# Patient Record
Sex: FEMALE | Race: WHITE | NOT HISPANIC OR LATINO | Employment: FULL TIME | URBAN - METROPOLITAN AREA
[De-identification: names, ages, dates, MRNs, and addresses within clinical notes are randomized per-mention and may not be internally consistent; named-entity substitution may affect disease eponyms.]

---

## 2017-03-30 ENCOUNTER — LAB VISIT (OUTPATIENT)
Dept: LAB | Facility: HOSPITAL | Age: 57
End: 2017-03-30
Attending: INTERNAL MEDICINE

## 2017-03-30 DIAGNOSIS — E05.90 HYPERTHYROIDISM: ICD-10-CM

## 2017-03-30 DIAGNOSIS — E04.9 GOITER, UNSPECIFIED: Primary | ICD-10-CM

## 2017-03-30 LAB
T4 SERPL-MCNC: 7.1 UG/DL
TSH SERPL DL<=0.005 MIU/L-ACNC: 1.34 UIU/ML

## 2017-03-30 PROCEDURE — 36415 COLL VENOUS BLD VENIPUNCTURE: CPT

## 2017-03-30 PROCEDURE — 84436 ASSAY OF TOTAL THYROXINE: CPT

## 2017-03-30 PROCEDURE — 84443 ASSAY THYROID STIM HORMONE: CPT

## 2017-10-16 ENCOUNTER — LAB VISIT (OUTPATIENT)
Dept: LAB | Facility: HOSPITAL | Age: 57
End: 2017-10-16
Attending: INTERNAL MEDICINE

## 2017-10-16 DIAGNOSIS — E03.9 HYPOTHYROIDISM: Primary | ICD-10-CM

## 2017-10-16 LAB — TSH SERPL DL<=0.005 MIU/L-ACNC: 1.92 UIU/ML

## 2017-10-16 PROCEDURE — 36415 COLL VENOUS BLD VENIPUNCTURE: CPT

## 2017-10-16 PROCEDURE — 84436 ASSAY OF TOTAL THYROXINE: CPT

## 2017-10-16 PROCEDURE — 84443 ASSAY THYROID STIM HORMONE: CPT

## 2017-10-17 LAB — T4 SERPL-MCNC: 8.8 UG/DL

## 2018-04-09 ENCOUNTER — LAB VISIT (OUTPATIENT)
Dept: LAB | Facility: HOSPITAL | Age: 58
End: 2018-04-09
Attending: INTERNAL MEDICINE

## 2018-04-09 DIAGNOSIS — E03.9 HYPOTHYROIDISM, ADULT: Primary | ICD-10-CM

## 2018-04-09 LAB
T4 SERPL-MCNC: 8.7 UG/DL
TSH SERPL DL<=0.005 MIU/L-ACNC: 1.04 UIU/ML

## 2018-04-09 PROCEDURE — 84443 ASSAY THYROID STIM HORMONE: CPT

## 2018-04-09 PROCEDURE — 36415 COLL VENOUS BLD VENIPUNCTURE: CPT

## 2018-04-09 PROCEDURE — 84436 ASSAY OF TOTAL THYROXINE: CPT

## 2018-12-10 ENCOUNTER — LAB VISIT (OUTPATIENT)
Dept: LAB | Facility: HOSPITAL | Age: 58
End: 2018-12-10
Attending: INTERNAL MEDICINE

## 2018-12-10 DIAGNOSIS — E03.9 HYPOTHYROIDISM: Primary | ICD-10-CM

## 2018-12-10 LAB
T4 SERPL-MCNC: 10.2 UG/DL
TSH SERPL DL<=0.005 MIU/L-ACNC: 0.91 UIU/ML

## 2018-12-10 PROCEDURE — 36415 COLL VENOUS BLD VENIPUNCTURE: CPT

## 2018-12-10 PROCEDURE — 84443 ASSAY THYROID STIM HORMONE: CPT

## 2018-12-10 PROCEDURE — 84436 ASSAY OF TOTAL THYROXINE: CPT

## 2019-08-09 ENCOUNTER — LAB VISIT (OUTPATIENT)
Dept: LAB | Facility: HOSPITAL | Age: 59
End: 2019-08-09
Attending: INTERNAL MEDICINE
Payer: MEDICAID

## 2019-08-09 DIAGNOSIS — E03.9 HYPOTHYROIDISM: ICD-10-CM

## 2019-08-09 DIAGNOSIS — R53.83 FATIGUE: ICD-10-CM

## 2019-08-09 DIAGNOSIS — Z78.0 POST-MENOPAUSE: Primary | ICD-10-CM

## 2019-08-09 LAB
IRON SERPL-MCNC: 49 UG/DL (ref 30–160)
T4 SERPL-MCNC: 9.3 UG/DL (ref 4.5–11.5)
TSH SERPL DL<=0.005 MIU/L-ACNC: 1.25 UIU/ML (ref 0.4–4)

## 2019-08-09 PROCEDURE — 36415 COLL VENOUS BLD VENIPUNCTURE: CPT

## 2019-08-09 PROCEDURE — 84443 ASSAY THYROID STIM HORMONE: CPT

## 2019-08-09 PROCEDURE — 84436 ASSAY OF TOTAL THYROXINE: CPT

## 2019-08-09 PROCEDURE — 82306 VITAMIN D 25 HYDROXY: CPT

## 2019-08-09 PROCEDURE — 83540 ASSAY OF IRON: CPT

## 2019-08-09 PROCEDURE — 82607 VITAMIN B-12: CPT

## 2019-08-10 LAB
25(OH)D3+25(OH)D2 SERPL-MCNC: 25 NG/ML (ref 30–96)
VIT B12 SERPL-MCNC: 243 PG/ML (ref 210–950)

## 2020-01-10 ENCOUNTER — HOSPITAL ENCOUNTER (EMERGENCY)
Facility: HOSPITAL | Age: 60
Discharge: HOME | End: 2020-01-11
Attending: SURGERY
Payer: MEDICAID

## 2020-01-10 DIAGNOSIS — J40 BRONCHITIS: Primary | ICD-10-CM

## 2020-01-10 DIAGNOSIS — R06.02 SOB (SHORTNESS OF BREATH): ICD-10-CM

## 2020-01-10 LAB
BASOPHILS # BLD AUTO: 0.04 K/UL (ref 0–0.2)
BASOPHILS NFR BLD: 0.7 % (ref 0–1.9)
DIFFERENTIAL METHOD: ABNORMAL
EOSINOPHIL # BLD AUTO: 0.1 K/UL (ref 0–0.5)
EOSINOPHIL NFR BLD: 2 % (ref 0–8)
ERYTHROCYTE [DISTWIDTH] IN BLOOD BY AUTOMATED COUNT: 12.4 % (ref 11.5–14.5)
HCT VFR BLD AUTO: 41.3 % (ref 37–48.5)
HGB BLD-MCNC: 14.2 G/DL (ref 12–16)
IMM GRANULOCYTES # BLD AUTO: 0 K/UL (ref 0–0.04)
IMM GRANULOCYTES NFR BLD AUTO: 0 % (ref 0–0.5)
LYMPHOCYTES # BLD AUTO: 2.2 K/UL (ref 1–4.8)
LYMPHOCYTES NFR BLD: 38.6 % (ref 18–48)
MCH RBC QN AUTO: 28.3 PG (ref 27–31)
MCHC RBC AUTO-ENTMCNC: 34.4 G/DL (ref 32–36)
MCV RBC AUTO: 82 FL (ref 82–98)
MONOCYTES # BLD AUTO: 0.5 K/UL (ref 0.3–1)
MONOCYTES NFR BLD: 8.1 % (ref 4–15)
NEUTROPHILS # BLD AUTO: 2.8 K/UL (ref 1.8–7.7)
NEUTROPHILS NFR BLD: 50.6 % (ref 38–73)
NRBC BLD-RTO: 0 /100 WBC
PLATELET # BLD AUTO: 184 K/UL (ref 150–350)
PMV BLD AUTO: 9.1 FL (ref 9.2–12.9)
RBC # BLD AUTO: 5.01 M/UL (ref 4–5.4)
WBC # BLD AUTO: 5.59 K/UL (ref 3.9–12.7)

## 2020-01-10 PROCEDURE — 84484 ASSAY OF TROPONIN QUANT: CPT

## 2020-01-10 PROCEDURE — 83880 ASSAY OF NATRIURETIC PEPTIDE: CPT

## 2020-01-10 PROCEDURE — 80053 COMPREHEN METABOLIC PANEL: CPT

## 2020-01-10 PROCEDURE — 96375 TX/PRO/DX INJ NEW DRUG ADDON: CPT

## 2020-01-10 PROCEDURE — 85025 COMPLETE CBC W/AUTO DIFF WBC: CPT

## 2020-01-10 PROCEDURE — 93010 ELECTROCARDIOGRAM REPORT: CPT | Mod: ,,, | Performed by: INTERNAL MEDICINE

## 2020-01-10 PROCEDURE — 25000242 PHARM REV CODE 250 ALT 637 W/ HCPCS: Performed by: SURGERY

## 2020-01-10 PROCEDURE — 99285 EMERGENCY DEPT VISIT HI MDM: CPT | Mod: 25

## 2020-01-10 PROCEDURE — 83605 ASSAY OF LACTIC ACID: CPT

## 2020-01-10 PROCEDURE — 93010 EKG 12-LEAD: ICD-10-PCS | Mod: ,,, | Performed by: INTERNAL MEDICINE

## 2020-01-10 PROCEDURE — 63600175 PHARM REV CODE 636 W HCPCS: Performed by: SURGERY

## 2020-01-10 PROCEDURE — 85379 FIBRIN DEGRADATION QUANT: CPT

## 2020-01-10 PROCEDURE — 36415 COLL VENOUS BLD VENIPUNCTURE: CPT

## 2020-01-10 PROCEDURE — 85610 PROTHROMBIN TIME: CPT

## 2020-01-10 PROCEDURE — 94760 N-INVAS EAR/PLS OXIMETRY 1: CPT

## 2020-01-10 PROCEDURE — 93005 ELECTROCARDIOGRAM TRACING: CPT

## 2020-01-10 PROCEDURE — 96365 THER/PROPH/DIAG IV INF INIT: CPT

## 2020-01-10 PROCEDURE — 85730 THROMBOPLASTIN TIME PARTIAL: CPT

## 2020-01-10 PROCEDURE — 87040 BLOOD CULTURE FOR BACTERIA: CPT

## 2020-01-10 PROCEDURE — 94644 CONT INHLJ TX 1ST HOUR: CPT

## 2020-01-10 PROCEDURE — 82550 ASSAY OF CK (CPK): CPT

## 2020-01-10 PROCEDURE — 82553 CREATINE MB FRACTION: CPT

## 2020-01-10 PROCEDURE — 87502 INFLUENZA DNA AMP PROBE: CPT

## 2020-01-10 RX ORDER — LEVOFLOXACIN 5 MG/ML
750 INJECTION, SOLUTION INTRAVENOUS
Status: COMPLETED | OUTPATIENT
Start: 2020-01-10 | End: 2020-01-11

## 2020-01-10 RX ORDER — LEVOTHYROXINE SODIUM 88 UG/1
88 TABLET ORAL DAILY
COMMUNITY

## 2020-01-10 RX ORDER — PROMETHAZINE HYDROCHLORIDE AND CODEINE PHOSPHATE 6.25; 1 MG/5ML; MG/5ML
5 SOLUTION ORAL
Status: COMPLETED | OUTPATIENT
Start: 2020-01-11 | End: 2020-01-11

## 2020-01-10 RX ORDER — METHYLPREDNISOLONE SOD SUCC 125 MG
125 VIAL (EA) INJECTION
Status: COMPLETED | OUTPATIENT
Start: 2020-01-10 | End: 2020-01-10

## 2020-01-10 RX ORDER — ALBUTEROL SULFATE 2.5 MG/.5ML
10 SOLUTION RESPIRATORY (INHALATION)
Status: COMPLETED | OUTPATIENT
Start: 2020-01-10 | End: 2020-01-10

## 2020-01-10 RX ADMIN — ALBUTEROL SULFATE 10 MG: 2.5 SOLUTION RESPIRATORY (INHALATION) at 11:01

## 2020-01-10 RX ADMIN — METHYLPREDNISOLONE SODIUM SUCCINATE 125 MG: 125 INJECTION, POWDER, FOR SOLUTION INTRAMUSCULAR; INTRAVENOUS at 11:01

## 2020-01-11 VITALS
RESPIRATION RATE: 17 BRPM | SYSTOLIC BLOOD PRESSURE: 109 MMHG | OXYGEN SATURATION: 95 % | WEIGHT: 140 LBS | TEMPERATURE: 97 F | HEART RATE: 85 BPM | DIASTOLIC BLOOD PRESSURE: 56 MMHG

## 2020-01-11 LAB
ALBUMIN SERPL BCP-MCNC: 4.3 G/DL (ref 3.5–5.2)
ALP SERPL-CCNC: 104 U/L (ref 55–135)
ALT SERPL W/O P-5'-P-CCNC: 27 U/L (ref 10–44)
ANION GAP SERPL CALC-SCNC: 11 MMOL/L (ref 8–16)
APTT BLDCRRT: 28.7 SEC (ref 21–32)
AST SERPL-CCNC: 36 U/L (ref 10–40)
BILIRUB SERPL-MCNC: 0.5 MG/DL (ref 0.1–1)
BNP SERPL-MCNC: 25 PG/ML (ref 0–99)
BUN SERPL-MCNC: 14 MG/DL (ref 8–23)
CALCIUM SERPL-MCNC: 10.1 MG/DL (ref 8.7–10.5)
CHLORIDE SERPL-SCNC: 106 MMOL/L (ref 95–110)
CK MB SERPL-MCNC: 1 NG/ML (ref 0.1–6.5)
CK MB SERPL-MCNC: 1.3 NG/ML (ref 0.1–6.5)
CK MB SERPL-RTO: 1.1 % (ref 0–5)
CK MB SERPL-RTO: 1.2 % (ref 0–5)
CK SERPL-CCNC: 107 U/L (ref 20–180)
CK SERPL-CCNC: 107 U/L (ref 20–180)
CK SERPL-CCNC: 93 U/L (ref 20–180)
CK SERPL-CCNC: 93 U/L (ref 20–180)
CO2 SERPL-SCNC: 24 MMOL/L (ref 23–29)
CREAT SERPL-MCNC: 0.8 MG/DL (ref 0.5–1.4)
D DIMER PPP IA.FEU-MCNC: 0.19 MG/L FEU
EST. GFR  (AFRICAN AMERICAN): >60 ML/MIN/1.73 M^2
EST. GFR  (NON AFRICAN AMERICAN): >60 ML/MIN/1.73 M^2
GLUCOSE SERPL-MCNC: 100 MG/DL (ref 70–110)
INFLUENZA A, MOLECULAR: NEGATIVE
INFLUENZA B, MOLECULAR: NEGATIVE
INR PPP: 0.9 (ref 0.8–1.2)
LACTATE SERPL-SCNC: 1.7 MMOL/L (ref 0.5–2.2)
POTASSIUM SERPL-SCNC: 3.7 MMOL/L (ref 3.5–5.1)
PROT SERPL-MCNC: 7.8 G/DL (ref 6–8.4)
PROTHROMBIN TIME: 9.9 SEC (ref 9–12.5)
SODIUM SERPL-SCNC: 141 MMOL/L (ref 136–145)
SPECIMEN SOURCE: NORMAL
TROPONIN I SERPL DL<=0.01 NG/ML-MCNC: 0.01 NG/ML (ref 0–0.03)
TROPONIN I SERPL DL<=0.01 NG/ML-MCNC: 0.02 NG/ML (ref 0–0.03)

## 2020-01-11 PROCEDURE — 63600175 PHARM REV CODE 636 W HCPCS: Performed by: SURGERY

## 2020-01-11 PROCEDURE — 82553 CREATINE MB FRACTION: CPT

## 2020-01-11 PROCEDURE — 25000003 PHARM REV CODE 250: Performed by: SURGERY

## 2020-01-11 PROCEDURE — 36415 COLL VENOUS BLD VENIPUNCTURE: CPT

## 2020-01-11 PROCEDURE — 84484 ASSAY OF TROPONIN QUANT: CPT

## 2020-01-11 PROCEDURE — 82550 ASSAY OF CK (CPK): CPT

## 2020-01-11 RX ORDER — LEVOFLOXACIN 500 MG/1
500 TABLET, FILM COATED ORAL DAILY
Qty: 7 TABLET | Refills: 0 | Status: SHIPPED | OUTPATIENT
Start: 2020-01-11 | End: 2020-01-18

## 2020-01-11 RX ORDER — METHYLPREDNISOLONE 4 MG/1
TABLET ORAL
Qty: 1 PACKAGE | Refills: 0 | Status: SHIPPED | OUTPATIENT
Start: 2020-01-11 | End: 2020-08-12

## 2020-01-11 RX ORDER — METHYLPREDNISOLONE 4 MG/1
TABLET ORAL
Qty: 1 PACKAGE | Refills: 0 | Status: SHIPPED | OUTPATIENT
Start: 2020-01-11 | End: 2020-01-11 | Stop reason: SDUPTHER

## 2020-01-11 RX ORDER — PROMETHAZINE HYDROCHLORIDE AND DEXTROMETHORPHAN HYDROBROMIDE 6.25; 15 MG/5ML; MG/5ML
5 SYRUP ORAL EVERY 6 HOURS PRN
Qty: 118 ML | Refills: 0 | Status: SHIPPED | OUTPATIENT
Start: 2020-01-11 | End: 2020-01-11 | Stop reason: SDUPTHER

## 2020-01-11 RX ORDER — PROMETHAZINE HYDROCHLORIDE AND DEXTROMETHORPHAN HYDROBROMIDE 6.25; 15 MG/5ML; MG/5ML
5 SYRUP ORAL EVERY 6 HOURS PRN
Qty: 118 ML | Refills: 0 | Status: SHIPPED | OUTPATIENT
Start: 2020-01-11 | End: 2020-01-21

## 2020-01-11 RX ORDER — LEVOFLOXACIN 500 MG/1
500 TABLET, FILM COATED ORAL DAILY
Qty: 7 TABLET | Refills: 0 | Status: SHIPPED | OUTPATIENT
Start: 2020-01-11 | End: 2020-01-11 | Stop reason: SDUPTHER

## 2020-01-11 RX ADMIN — LEVOFLOXACIN 750 MG: 750 INJECTION, SOLUTION INTRAVENOUS at 01:01

## 2020-01-11 RX ADMIN — PROMETHAZINE HYDROCHLORIDE AND CODEINE PHOSPHATE 5 ML: 10; 6.25 SOLUTION ORAL at 12:01

## 2020-01-11 NOTE — ED PROVIDER NOTES
[facility]                                                 Chief Complaint  59 y.o. 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 No pertinent surgical history.   No Known Allergies     I have reviewed all of this patient's past medical, surgical, family, and social   histories as well as active allergies and medications documented in the  electronic medical record    Review of Systems and Physical Exam      Review of Systems  -- Constitution - no fever, denies fatigue, no weakness, no chills  -- Eyes - no tearing or redness, no visual disturbance  -- Ear, Nose - no tinnitus or earache, no nasal congestion or discharge  -- Mouth,Throat - no sore throat, no toothache, normal voice, normal swallowing  -- Respiratory - cough and congestion, shortness of breath, sputum production  -- Cardiovascular - denies chest pain, no palpitations, denies claudication  -- Gastrointestinal - denies abdominal pain, nausea, vomiting, or diarrhea  -- Genitourinary - no dysuria, denies flank pain, no hematuria, no STD risk  -- Musculoskeletal - denies back pain, negative for trauma or injury  -- Neurological - no headache, denies weakness or seizure; no LOC  -- Skin - denies pallor, rash, or changes in skin. no hives or welts noted  -- Psychiatric - Denies SI or HI, no psychosis or fractured thought noted     Vital Signs  Her tympanic temperature is 97.2 °F (36.2  °C).   Her blood pressure is 109/56 and her pulse is 85.   Her respiration is 17 and oxygen saturation is 95%.     Physical Exam  -- Nursing note and vitals reviewed  -- Constitutional: Appears well-developed and well-nourished  -- Head: Atraumatic. Normocephalic. No obvious abnormality  -- Eyes: Pupils are equal and reactive to light. Normal conjunctiva and lids  -- Nose: Nose normal in appearance, nares grossly normal. No discharge  -- Throat: Mucous membranes moist, pharynx normal, normal tonsils. No lesions   -- Ears: External ears and TM normal bilaterally. Normal hearing and no drainage  -- Neck: Normal range of motion. Neck supple. No masses, trachea midline  -- Cardiac: Normal rate, regular rhythm and normal heart sounds  -- Pulmonary: faint rhonchi at the bilateral bases with no active wheezing   -- Abdominal: Soft, no tenderness. Normal bowel sounds. Normal liver edge  -- Musculoskeletal: Normal range of motion, no effusions. Joints stable   -- Neurological: No focal deficits. Showed good interaction with staff  -- Vascular: Posterior tibial, dorsalis pedis and radial pulses 2+ bilaterally    -- Lymphatics: No cervical or peripheral lymphadenopathy. No edema noted  -- Skin: Warm and dry. No evidence of rash or cellulitis  -- Psychiatric: Normal mood and affect. Bedside behavior is appropriate    Emergency Room Course      Lab Results     K 3.7      CO2 24   BUN 14   CREATININE 0.8      ALKPHOS 104   AST 36   ALT 27   BILITOT 0.5   ALBUMIN 4.3   PROT 7.8   WBC 5.59   HGB 14.2   HCT 41.3      CPK 93   CPK 93   CPKMB 1.0   TROPONINI 0.006   INR 0.9   BNP 25   DDIMER 0.19   LACTATE 1.7     EKG   -- The EKG findings today were without concerning findings from baseline  -- The troponin drawn in the ER today was within normal limits  -- The 2nd troponin drawn in the ER today was within normal limits      Radiology  -- Chest x-ray showed no infiltrate and showed no acute pathology      Medications Given  albuterol sulfate nebulizer solution 10 mg (10 mg Nebulization Given 1/10/20 3252)   methylPREDNISolone sodium succinate injection 125 mg (125 mg Intravenous Given 1/10/20 6285)   levoFLOXacin 750 mg/150 mL IVPB 750 mg (0 mg Intravenous Stopped 1/11/20 0303)   promethazine-codeine 6.25-10 mg/5 ml syrup 5 mL (5 mLs Oral Given 1/11/20 0004)     ED Physician Management  -- Diagnosis management comments: 59 y.o. female with cough and congestion  -- patient with cough and congestion with sputum production reported on ROS  -- patient had a negative x-ray negative metabolic workup today in the ER also  -- patient given IV steroids and IV Levaquin today in the ER, breathing treatment   -- as a precaution 2 sets of negative troponins were performed, normal EKG  -- patient feels much better will go home on antibiotics and cough syrup/steroids  -- return to the ER with any concerning signs or symptoms after discharge today    Diagnosis  -- The primary encounter diagnosis was Bronchitis.   -- A diagnosis of SOB (shortness of breath) was also pertinent to this visit.    Disposition and Plan  -- Disposition: home  -- Condition: stable  -- Follow-up: Patient to follow up with Sunny Sharma MD in 1-2 days.  -- I advised the patient that we have found no life threatening condition today  -- At this time, I believe the patient is clinically stable for discharge.   -- The patient acknowledges that close follow up with a MD is required   -- Patient agrees to comply with all instruction and direction given in the ER    This note is dictated on M*Modal word recognition program.  There are word recognition mistakes that are occasionally missed on review.         Shukri Connell MD  01/11/20 0827

## 2020-01-11 NOTE — ED TRIAGE NOTES
Patient reports coughing up green sputum the past few days. She reports tonight she became short of breath with pleurisy.

## 2020-01-16 LAB
BACTERIA BLD CULT: NORMAL
BACTERIA BLD CULT: NORMAL

## 2020-05-07 ENCOUNTER — LAB VISIT (OUTPATIENT)
Dept: LAB | Facility: HOSPITAL | Age: 60
End: 2020-05-07
Attending: INTERNAL MEDICINE
Payer: MEDICAID

## 2020-05-07 DIAGNOSIS — E03.9 MYXEDEMA HEART DISEASE: Primary | ICD-10-CM

## 2020-05-07 DIAGNOSIS — E55.9 VITAMIN D DEFICIENCY: ICD-10-CM

## 2020-05-07 DIAGNOSIS — I51.9 MYXEDEMA HEART DISEASE: Primary | ICD-10-CM

## 2020-05-07 LAB
T4 SERPL-MCNC: 9.2 UG/DL (ref 4.5–11.5)
TSH SERPL DL<=0.005 MIU/L-ACNC: 1.12 UIU/ML (ref 0.4–4)

## 2020-05-07 PROCEDURE — 84443 ASSAY THYROID STIM HORMONE: CPT

## 2020-05-07 PROCEDURE — 84436 ASSAY OF TOTAL THYROXINE: CPT

## 2020-05-07 PROCEDURE — 36415 COLL VENOUS BLD VENIPUNCTURE: CPT

## 2020-05-07 PROCEDURE — 82306 VITAMIN D 25 HYDROXY: CPT

## 2020-05-08 LAB — 25(OH)D3+25(OH)D2 SERPL-MCNC: 33 NG/ML (ref 30–96)

## 2020-06-25 ENCOUNTER — HOSPITAL ENCOUNTER (EMERGENCY)
Facility: HOSPITAL | Age: 60
Discharge: HOME | End: 2020-06-25
Attending: SURGERY
Payer: MEDICAID

## 2020-06-25 VITALS
RESPIRATION RATE: 16 BRPM | OXYGEN SATURATION: 97 % | TEMPERATURE: 98 F | DIASTOLIC BLOOD PRESSURE: 65 MMHG | SYSTOLIC BLOOD PRESSURE: 124 MMHG | HEART RATE: 82 BPM

## 2020-06-25 DIAGNOSIS — R52 PAIN: ICD-10-CM

## 2020-06-25 DIAGNOSIS — S62.102A CLOSED FRACTURE OF LEFT WRIST, INITIAL ENCOUNTER: Primary | ICD-10-CM

## 2020-06-25 PROCEDURE — 99284 EMERGENCY DEPT VISIT MOD MDM: CPT | Mod: 25

## 2020-06-25 PROCEDURE — 25000003 PHARM REV CODE 250: Performed by: SURGERY

## 2020-06-25 RX ORDER — IBUPROFEN 800 MG/1
800 TABLET ORAL EVERY 6 HOURS PRN
Qty: 20 TABLET | Refills: 0 | Status: SHIPPED | OUTPATIENT
Start: 2020-06-25 | End: 2020-08-12

## 2020-06-25 RX ORDER — IBUPROFEN 800 MG/1
800 TABLET ORAL
Status: COMPLETED | OUTPATIENT
Start: 2020-06-25 | End: 2020-06-25

## 2020-06-25 RX ORDER — CYCLOBENZAPRINE HCL 10 MG
10 TABLET ORAL 3 TIMES DAILY PRN
Qty: 10 TABLET | Refills: 0 | Status: SHIPPED | OUTPATIENT
Start: 2020-06-25 | End: 2020-06-30

## 2020-06-25 RX ORDER — ACETAMINOPHEN 500 MG
1000 TABLET ORAL
Status: COMPLETED | OUTPATIENT
Start: 2020-06-25 | End: 2020-06-25

## 2020-06-25 RX ADMIN — ACETAMINOPHEN 1000 MG: 500 TABLET, FILM COATED ORAL at 11:06

## 2020-06-25 RX ADMIN — IBUPROFEN 800 MG: 800 TABLET ORAL at 11:06

## 2020-06-25 NOTE — Clinical Note
Vikki Hubbard was seen and treated in our emergency department on 6/25/2020.  She may return to work after being cleared by follow-up physician 07/09/2020.       If you have any questions or concerns, please don't hesitate to call.      ROSA HINES RN

## 2020-06-25 NOTE — ED TRIAGE NOTES
STATED SHE TRIPPED, FELL BACKWARDS AND HURT LEFT WRIST.  NO DEFORMITY NOTED.  MILD EDEMA.  GOOD DISTAL SENSATION.

## 2020-06-25 NOTE — ED PROVIDER NOTES
[facility]                                                 Chief Complaint  59 y.o. female with Wrist Injury (LEFT WRIST PAIN/FALL)      History of Present Illness  Vikki Hubbard presents to the emergency room with left wrist pain today  Accidental slip and fall with immediate left wrist pain after the injury  No gross deformity on ER evaluation, no swelling noted on ER exam  X-ray shows a minimal nondisplaced radial wrist fracture today here    The history is provided by the patient   device was not used during this ER visit  Medical history: Thyroid disease  History reviewed. No pertinent surgical history.   No Known Allergies     I have reviewed all of this patient's past medical, surgical, family, and social   histories as well as active allergies and medications documented in the  electronic medical record    Review of Systems and Physical Exam      Review of Systems  -- Constitution - no fever, denies fatigue, no weakness, no chills  -- Eyes - no tearing or redness, no visual disturbance  -- Ear, Nose - no tinnitus or earache, no nasal congestion or discharge  -- Mouth,Throat - no sore throat, no toothache, normal voice, normal swallowing  -- Respiratory - denies cough and congestion, no shortness of breath, no RAY  -- Cardiovascular - denies chest pain, no palpitations, denies claudication  -- Gastrointestinal - denies abdominal pain, nausea, vomiting, or diarrhea  -- Genitourinary - no dysuria, denies flank pain, no hematuria, no STD risk  -- Musculoskeletal - left wrist pain  -- Neurological - no headache, denies weakness or seizure; no LOC  -- Skin - denies pallor, rash, or changes in skin. no hives or welts noted    Vital Signs  Her blood pressure is 124/65 and her pulse is 82.   Her respiration is 16 and oxygen saturation is 97%.     Physical Exam  -- Nursing note and vitals reviewed  -- Constitutional: Appears well-developed and well-nourished  -- Head: Atraumatic.  Normocephalic. No obvious abnormality  -- Eyes: Pupils are equal and reactive to light. Normal conjunctiva and lids  -- Cardiac: Normal rate, regular rhythm and normal heart sounds  -- Pulmonary: Normal respiratory effort, breath sounds clear to auscultation  -- Abdominal: Soft, no tenderness. Normal bowel sounds. Normal liver edge  -- Musculoskeletal: Normal range of motion, no effusions. Joints stable   -- Neurological: No focal deficits. Showed good interaction with staff  -- Vascular: Posterior tibial, dorsalis pedis and radial pulses 2+ bilaterally      Emergency Room Course      Treatment and Evaluation  -- left radial wrist fracture on ER evaluation  -- Velco wrist splint applied by the physician  -- Sling on the affected limb by the CNA for comfort and decreased pain    -- PO 1 g Tylenol given in today in the ER  --  mg Motrin given in today in the ER    Diagnosis  [S62.102A] Closed fracture of left wrist, initial encounter (Primary)    Disposition and Plan  -- Disposition: home  -- Condition: stable  -- Follow-up: Patient to follow up with MD in 1-2 days.  -- I advised the patient that we have found no life threatening condition today  -- At this time, I believe the patient is clinically stable for discharge.   -- The patient acknowledges that close follow up with a MD is required   -- Patient agrees to comply with all instruction and direction given in the ER    This note is dictated on M*Modal word recognition program.  There are word recognition mistakes that are occasionally missed on review.         Shukri Connell MD  06/25/20 2861

## 2020-06-25 NOTE — Clinical Note
Vikki Hubbard was seen and treated in our emergency department on 6/25/2020.  She may return to work on 07/03/2020.       If you have any questions or concerns, please don't hesitate to call.      ROSA HINES RN

## 2020-08-12 ENCOUNTER — OFFICE VISIT (OUTPATIENT)
Dept: OBSTETRICS AND GYNECOLOGY | Facility: CLINIC | Age: 60
End: 2020-08-12
Payer: MEDICAID

## 2020-08-12 VITALS
HEART RATE: 105 BPM | SYSTOLIC BLOOD PRESSURE: 122 MMHG | BODY MASS INDEX: 25.17 KG/M2 | HEIGHT: 66 IN | RESPIRATION RATE: 14 BRPM | DIASTOLIC BLOOD PRESSURE: 68 MMHG | WEIGHT: 156.63 LBS

## 2020-08-12 DIAGNOSIS — N95.2 POST-MENOPAUSAL ATROPHIC VAGINITIS: ICD-10-CM

## 2020-08-12 DIAGNOSIS — N94.10 DYSPAREUNIA IN FEMALE: ICD-10-CM

## 2020-08-12 DIAGNOSIS — Z12.31 SCREENING MAMMOGRAM, ENCOUNTER FOR: ICD-10-CM

## 2020-08-12 DIAGNOSIS — Z12.4 CERVICAL CANCER SCREENING: ICD-10-CM

## 2020-08-12 DIAGNOSIS — Z01.419 WELL WOMAN EXAM WITH ROUTINE GYNECOLOGICAL EXAM: Primary | ICD-10-CM

## 2020-08-12 DIAGNOSIS — Z78.0 ENCOUNTER FOR OSTEOPOROSIS SCREENING IN ASYMPTOMATIC POSTMENOPAUSAL PATIENT: ICD-10-CM

## 2020-08-12 DIAGNOSIS — Z13.820 ENCOUNTER FOR OSTEOPOROSIS SCREENING IN ASYMPTOMATIC POSTMENOPAUSAL PATIENT: ICD-10-CM

## 2020-08-12 PROCEDURE — 87624 HPV HI-RISK TYP POOLED RSLT: CPT

## 2020-08-12 PROCEDURE — 99386 PR PREVENTIVE VISIT,NEW,40-64: ICD-10-PCS | Mod: S$PBB,,, | Performed by: OBSTETRICS & GYNECOLOGY

## 2020-08-12 PROCEDURE — 99999 PR PBB SHADOW E&M-EST. PATIENT-LVL III: CPT | Mod: PBBFAC,,, | Performed by: OBSTETRICS & GYNECOLOGY

## 2020-08-12 PROCEDURE — 99213 OFFICE O/P EST LOW 20 MIN: CPT | Mod: PBBFAC,PN | Performed by: OBSTETRICS & GYNECOLOGY

## 2020-08-12 PROCEDURE — 99999 PR PBB SHADOW E&M-EST. PATIENT-LVL III: ICD-10-PCS | Mod: PBBFAC,,, | Performed by: OBSTETRICS & GYNECOLOGY

## 2020-08-12 PROCEDURE — 88175 CYTOPATH C/V AUTO FLUID REDO: CPT

## 2020-08-12 PROCEDURE — 99386 PREV VISIT NEW AGE 40-64: CPT | Mod: S$PBB,,, | Performed by: OBSTETRICS & GYNECOLOGY

## 2020-08-12 RX ORDER — ESTRADIOL 0.1 MG/G
1 CREAM VAGINAL
Qty: 42.5 G | Refills: 1 | Status: SHIPPED | OUTPATIENT
Start: 2020-08-13 | End: 2021-08-13

## 2020-08-12 NOTE — PROGRESS NOTES
Subjective:    Patient ID: Vikki Hubbard is a 59 y.o. y.o. female.     Chief Complaint: Annual Well Woman Exam     History of Present Illness:  Vikki presents today for Annual Well Woman exam. She describes her menses as menopausal since  with no PMB.She denies pelvic pain.  She denies breast tenderness, masses, nipple discharge. She denies difficulty with urination or bowel movements. She reports vaginal dryness and pain with intercourse secondary to dryness. She denies bloating, early satiety, or weight changes. She is sexually active.     Menstrual History:   No LMP recorded. Patient is postmenopausal..     OB History    : 4  Para: 4  Term: 4            The following portions of the patient's history were reviewed and updated as appropriate: allergies, current medications, past family history, past medical history, past social history, past surgical history and problem list.    ROS:   CONSTITUTIONAL: Negative for fever, chills, diaphoresis, weakness, fatigue, weight loss, weight gain  ENT: negative for sore throat, nasal congestion, nasal discharge, epistaxis, tinnitus, hearing loss  EYES: negative for blurry vision, decreased vision, loss of vision, eye pain, diplopia, photophobia, discharge  SKIN: Negative for rash, itching, hives  RESPIRATORY: negative for cough, hemoptysis, shortness of breath, pleuritic chest pain, wheezing  CARDIOVASCULAR: negative for chest pain, dyspnea on exertion, orthopnea, paroxysmal nocturnal dyspnea, edema, palpitations  BREAST: negative for breast  tenderness, breast mass, nipple discharge, or skin changes  GASTROINTESTINAL: negative for abdominal pain, flank pain, nausea, vomiting, diarrhea, constipation, black stool, blood in stool  GENITOURINARY: negative for abnormal vaginal bleeding, amenorrhea, decreased libido, dysuria, genital sores, hematuria, incontinence, menorrhagia, pelvic pain, urinary frequency, vaginal discharge  HEMATOLOGIC/LYMPHATIC: negative for  swollen lymph nodes, bleeding, bruising  MUSCULOSKELETAL: negative for back pain, joint pain, joint stiffness, joint swelling, muscle pain, muscle weakness  NEUROLOGICAL: negative for dizzy/vertigo, headache, focal weakness, numbness/tingling, speech problems, loss of consciousness, confusion, memory loss  BEHAVORIAL/PSYCH: negative for anxiety, depression, psychosis  ENDOCRINE: thyroid d/o, negative for polydipsia/polyuria, palpitations, skin changes, temperature intolerance, unexpected weight changes  ALLERGIC/IMMUNOLOGIC: negative for urticaria, hay fever, angioedema      Objective:    Vital Signs:  Vitals:    08/12/20 1138   BP: 122/68   Pulse: 105   Resp: 14       Physical Exam:  General:  alert, cooperative, no distress   Skin:  Skin color, texture, turgor normal. No rashes or lesions   HEENT:  extra ocular movement intact, sclera clear, anicteric   Neck: supple, trachea midline, no adenopathy or thyromegally   Respiratory:  Normal effort   Breasts:  no discharge, erythema, tenderness, or palpable masses; no axillary lymphadenopathy   Abdomen:  soft, nontender, no palpable masses   Pelvis: External genitalia: normal general appearance  Urinary system: urethral meatus normal, bladder nontender  Vaginal: normal without tenderness, induration or masses, atrophic mucosa, rectocele present, grade 1  Cervix: normal appearance  Uterus: normal size, shape, position  Adnexa: normal size, nontender bilaterally   Extremities: Normal ROM; no edema, no cyanosis   Neurologial: Normal strength and tone. No focal numbness or weakness.   Psychiatric: normal mood, speech, dress, and thought processes         Assessment:       Healthy female exam.     1. Well woman exam with routine gynecological exam    2. Cervical cancer screening    3. Post-menopausal atrophic vaginitis    4. Dyspareunia in female    5. Screening mammogram, encounter for    6. Encounter for osteoporosis screening in asymptomatic postmenopausal patient           Plan:      Well woman exam with routine gynecological exam    Cervical cancer screening  -     Liquid-Based Pap Smear, Screening  -     HPV High Risk Genotypes, PCR    Post-menopausal atrophic vaginitis  -     estradioL (ESTRACE) 0.01 % (0.1 mg/gram) vaginal cream; Place 1 g vaginally twice a week.  Dispense: 42.5 g; Refill: 1    Dyspareunia in female  -     estradioL (ESTRACE) 0.01 % (0.1 mg/gram) vaginal cream; Place 1 g vaginally twice a week.  Dispense: 42.5 g; Refill: 1    Screening mammogram, encounter for  -     Mammo Digital Screening Bilat w/ [place]; Future; Expected date: 08/12/2020    Encounter for osteoporosis screening in asymptomatic postmenopausal patient  -     DXA Bone Density Spine And Hip; Future; Expected date: 08/12/2020            COUNSELING:  Vikki was counseled on A.C.O.G. Pap guidelines and recommendations for yearly pelvic exams in addition to recommendations for monthly self breast exams; to see her PCP for other health maintenance.

## 2020-08-24 LAB
HPV HR 12 DNA SPEC QL NAA+PROBE: NEGATIVE
HPV16 AG SPEC QL: NEGATIVE
HPV18 DNA SPEC QL NAA+PROBE: NEGATIVE

## 2020-08-27 LAB
FINAL PATHOLOGIC DIAGNOSIS: NORMAL
Lab: NORMAL

## 2020-09-09 ENCOUNTER — HOSPITAL ENCOUNTER (OUTPATIENT)
Dept: RADIOLOGY | Facility: HOSPITAL | Age: 60
Discharge: HOME | End: 2020-09-09
Attending: OBSTETRICS & GYNECOLOGY
Payer: MEDICAID

## 2020-09-09 VITALS — WEIGHT: 156 LBS | BODY MASS INDEX: 25.07 KG/M2 | HEIGHT: 66 IN

## 2020-09-09 DIAGNOSIS — Z13.820 ENCOUNTER FOR OSTEOPOROSIS SCREENING IN ASYMPTOMATIC POSTMENOPAUSAL PATIENT: ICD-10-CM

## 2020-09-09 DIAGNOSIS — Z78.0 ENCOUNTER FOR OSTEOPOROSIS SCREENING IN ASYMPTOMATIC POSTMENOPAUSAL PATIENT: ICD-10-CM

## 2020-09-09 DIAGNOSIS — Z12.31 SCREENING MAMMOGRAM, ENCOUNTER FOR: ICD-10-CM

## 2020-09-09 PROCEDURE — 77067 SCR MAMMO BI INCL CAD: CPT | Mod: TC

## 2020-09-09 PROCEDURE — 77080 DXA BONE DENSITY AXIAL: CPT | Mod: TC

## 2020-09-09 PROCEDURE — 77063 BREAST TOMOSYNTHESIS BI: CPT | Mod: 26,,, | Performed by: RADIOLOGY

## 2020-09-09 PROCEDURE — 77080 DEXA BONE DENSITY SPINE HIP: ICD-10-PCS | Mod: 26,,, | Performed by: RADIOLOGY

## 2020-09-09 PROCEDURE — 77063 MAMMO DIGITAL SCREENING BILAT WITH TOMOSYNTHESIS_CAD: ICD-10-PCS | Mod: 26,,, | Performed by: RADIOLOGY

## 2020-09-09 PROCEDURE — 77067 SCR MAMMO BI INCL CAD: CPT | Mod: 26,,, | Performed by: RADIOLOGY

## 2020-09-09 PROCEDURE — 77080 DXA BONE DENSITY AXIAL: CPT | Mod: 26,,, | Performed by: RADIOLOGY

## 2020-09-09 PROCEDURE — 77067 MAMMO DIGITAL SCREENING BILAT WITH TOMOSYNTHESIS_CAD: ICD-10-PCS | Mod: 26,,, | Performed by: RADIOLOGY

## 2020-11-10 ENCOUNTER — LAB VISIT (OUTPATIENT)
Dept: LAB | Facility: HOSPITAL | Age: 60
End: 2020-11-10
Attending: INTERNAL MEDICINE
Payer: MEDICAID

## 2020-11-10 DIAGNOSIS — E03.9 HYPOTHYROIDISM, ADULT: Primary | ICD-10-CM

## 2020-11-10 LAB
T4 SERPL-MCNC: 8.4 UG/DL (ref 4.5–11.5)
TSH SERPL DL<=0.005 MIU/L-ACNC: 2.46 UIU/ML (ref 0.4–4)

## 2020-11-10 PROCEDURE — 84443 ASSAY THYROID STIM HORMONE: CPT

## 2020-11-10 PROCEDURE — 36415 COLL VENOUS BLD VENIPUNCTURE: CPT

## 2020-11-10 PROCEDURE — 84436 ASSAY OF TOTAL THYROXINE: CPT

## 2021-05-28 ENCOUNTER — LAB VISIT (OUTPATIENT)
Dept: LAB | Facility: HOSPITAL | Age: 61
End: 2021-05-28
Attending: INTERNAL MEDICINE
Payer: MEDICAID

## 2021-05-28 DIAGNOSIS — E03.9 HYPOTHYROIDISM: ICD-10-CM

## 2021-05-28 DIAGNOSIS — R53.83 FATIGUE: Primary | ICD-10-CM

## 2021-05-28 LAB
25(OH)D3+25(OH)D2 SERPL-MCNC: 36 NG/ML (ref 30–96)
T4 SERPL-MCNC: 9.8 UG/DL (ref 4.5–11.5)
TSH SERPL DL<=0.005 MIU/L-ACNC: 1.25 UIU/ML (ref 0.4–4)

## 2021-05-28 PROCEDURE — 36415 COLL VENOUS BLD VENIPUNCTURE: CPT | Performed by: INTERNAL MEDICINE

## 2021-05-28 PROCEDURE — 84436 ASSAY OF TOTAL THYROXINE: CPT | Performed by: INTERNAL MEDICINE

## 2021-05-28 PROCEDURE — 84443 ASSAY THYROID STIM HORMONE: CPT | Performed by: INTERNAL MEDICINE

## 2021-05-28 PROCEDURE — 82306 VITAMIN D 25 HYDROXY: CPT | Performed by: INTERNAL MEDICINE

## 2021-11-30 ENCOUNTER — LAB VISIT (OUTPATIENT)
Dept: LAB | Facility: HOSPITAL | Age: 61
End: 2021-11-30
Attending: INTERNAL MEDICINE
Payer: MEDICAID

## 2021-11-30 DIAGNOSIS — E55.9 VITAMIN D DEFICIENCY: ICD-10-CM

## 2021-11-30 DIAGNOSIS — E03.9 PRIMARY HYPOTHYROIDISM: Primary | ICD-10-CM

## 2021-11-30 LAB
25(OH)D3+25(OH)D2 SERPL-MCNC: 34 NG/ML (ref 30–96)
T4 SERPL-MCNC: 7.7 UG/DL (ref 4.5–11.5)
TSH SERPL DL<=0.005 MIU/L-ACNC: 1.94 UIU/ML (ref 0.4–4)

## 2021-11-30 PROCEDURE — 84436 ASSAY OF TOTAL THYROXINE: CPT | Performed by: INTERNAL MEDICINE

## 2021-11-30 PROCEDURE — 84443 ASSAY THYROID STIM HORMONE: CPT | Performed by: INTERNAL MEDICINE

## 2021-11-30 PROCEDURE — 36415 COLL VENOUS BLD VENIPUNCTURE: CPT | Performed by: INTERNAL MEDICINE

## 2021-11-30 PROCEDURE — 82306 VITAMIN D 25 HYDROXY: CPT | Performed by: INTERNAL MEDICINE

## 2022-06-20 ENCOUNTER — LAB VISIT (OUTPATIENT)
Dept: LAB | Facility: HOSPITAL | Age: 62
End: 2022-06-20
Attending: INTERNAL MEDICINE

## 2022-06-20 DIAGNOSIS — E55.9 VITAMIN D DEFICIENCY: ICD-10-CM

## 2022-06-20 DIAGNOSIS — E03.9 HYPOTHYROIDISM: Primary | ICD-10-CM

## 2022-06-20 LAB
T4 SERPL-MCNC: 9.8 UG/DL (ref 4.5–11.5)
TSH SERPL DL<=0.005 MIU/L-ACNC: 0.53 UIU/ML (ref 0.4–4)

## 2022-06-20 PROCEDURE — 36415 COLL VENOUS BLD VENIPUNCTURE: CPT | Performed by: INTERNAL MEDICINE

## 2022-06-20 PROCEDURE — 82306 VITAMIN D 25 HYDROXY: CPT | Performed by: INTERNAL MEDICINE

## 2022-06-20 PROCEDURE — 84436 ASSAY OF TOTAL THYROXINE: CPT | Performed by: INTERNAL MEDICINE

## 2022-06-20 PROCEDURE — 84443 ASSAY THYROID STIM HORMONE: CPT | Performed by: INTERNAL MEDICINE

## 2022-06-21 LAB — 25(OH)D3+25(OH)D2 SERPL-MCNC: 31 NG/ML (ref 30–96)

## 2022-12-06 ENCOUNTER — LAB VISIT (OUTPATIENT)
Dept: LAB | Facility: HOSPITAL | Age: 62
End: 2022-12-06
Attending: INTERNAL MEDICINE

## 2022-12-06 DIAGNOSIS — E55.9 VITAMIN D DEFICIENCY: Primary | ICD-10-CM

## 2022-12-06 DIAGNOSIS — E03.9 HYPOTHYROIDISM: ICD-10-CM

## 2022-12-06 LAB
25(OH)D3+25(OH)D2 SERPL-MCNC: 31 NG/ML (ref 30–96)
T4 SERPL-MCNC: 10.2 UG/DL (ref 4.5–11.5)
TSH SERPL DL<=0.005 MIU/L-ACNC: 1.13 UIU/ML (ref 0.4–4)

## 2022-12-06 PROCEDURE — 84436 ASSAY OF TOTAL THYROXINE: CPT | Performed by: INTERNAL MEDICINE

## 2022-12-06 PROCEDURE — 84443 ASSAY THYROID STIM HORMONE: CPT | Performed by: INTERNAL MEDICINE

## 2022-12-06 PROCEDURE — 82306 VITAMIN D 25 HYDROXY: CPT | Performed by: INTERNAL MEDICINE

## 2022-12-06 PROCEDURE — 36415 COLL VENOUS BLD VENIPUNCTURE: CPT | Performed by: INTERNAL MEDICINE

## 2023-06-13 ENCOUNTER — LAB VISIT (OUTPATIENT)
Dept: LAB | Facility: HOSPITAL | Age: 63
End: 2023-06-13
Attending: INTERNAL MEDICINE

## 2023-06-13 DIAGNOSIS — E55.9 VITAMIN D DEFICIENCY: ICD-10-CM

## 2023-06-13 DIAGNOSIS — E03.9 HYPOTHYROIDISM: Primary | ICD-10-CM

## 2023-06-13 LAB
25(OH)D3+25(OH)D2 SERPL-MCNC: 29 NG/ML (ref 30–96)
T4 SERPL-MCNC: 8.8 UG/DL (ref 4.5–11.5)
TSH SERPL DL<=0.005 MIU/L-ACNC: 0.69 UIU/ML (ref 0.4–4)

## 2023-06-13 PROCEDURE — 84443 ASSAY THYROID STIM HORMONE: CPT | Performed by: INTERNAL MEDICINE

## 2023-06-13 PROCEDURE — 82306 VITAMIN D 25 HYDROXY: CPT | Performed by: INTERNAL MEDICINE

## 2023-06-13 PROCEDURE — 84436 ASSAY OF TOTAL THYROXINE: CPT | Performed by: INTERNAL MEDICINE

## 2023-06-13 PROCEDURE — 36415 COLL VENOUS BLD VENIPUNCTURE: CPT | Performed by: INTERNAL MEDICINE

## 2023-12-04 ENCOUNTER — LAB VISIT (OUTPATIENT)
Dept: LAB | Facility: HOSPITAL | Age: 63
End: 2023-12-04
Attending: INTERNAL MEDICINE
Payer: MEDICARE

## 2023-12-04 DIAGNOSIS — E03.9 HYPOTHYROIDISM: ICD-10-CM

## 2023-12-04 LAB — T4 SERPL-MCNC: 9.8 UG/DL (ref 4.5–11.5)

## 2023-12-04 PROCEDURE — 84436 ASSAY OF TOTAL THYROXINE: CPT | Performed by: INTERNAL MEDICINE

## 2023-12-04 PROCEDURE — 36415 COLL VENOUS BLD VENIPUNCTURE: CPT | Performed by: INTERNAL MEDICINE

## 2024-02-03 ENCOUNTER — HOSPITAL ENCOUNTER (EMERGENCY)
Facility: HOSPITAL | Age: 64
Discharge: HOME | End: 2024-02-03
Attending: EMERGENCY MEDICINE
Payer: MEDICARE

## 2024-02-03 VITALS
HEART RATE: 86 BPM | RESPIRATION RATE: 16 BRPM | TEMPERATURE: 98 F | HEIGHT: 65 IN | BODY MASS INDEX: 24.5 KG/M2 | SYSTOLIC BLOOD PRESSURE: 124 MMHG | WEIGHT: 147.06 LBS | DIASTOLIC BLOOD PRESSURE: 76 MMHG | OXYGEN SATURATION: 97 %

## 2024-02-03 DIAGNOSIS — M79.89 LEFT LEG SWELLING: ICD-10-CM

## 2024-02-03 DIAGNOSIS — I80.3 PHLEBITIS OF LEFT LEG: Primary | ICD-10-CM

## 2024-02-03 LAB
ALBUMIN SERPL BCP-MCNC: 4.1 G/DL (ref 3.5–5.2)
ALP SERPL-CCNC: 84 U/L (ref 55–135)
ALT SERPL W/O P-5'-P-CCNC: 11 U/L (ref 10–44)
ANION GAP SERPL CALC-SCNC: 10 MMOL/L (ref 8–16)
AST SERPL-CCNC: 19 U/L (ref 10–40)
BASOPHILS # BLD AUTO: 0.04 K/UL (ref 0–0.2)
BASOPHILS NFR BLD: 0.7 % (ref 0–1.9)
BILIRUB SERPL-MCNC: 1 MG/DL (ref 0.1–1)
BUN SERPL-MCNC: 11 MG/DL (ref 8–23)
CALCIUM SERPL-MCNC: 9.8 MG/DL (ref 8.7–10.5)
CHLORIDE SERPL-SCNC: 104 MMOL/L (ref 95–110)
CO2 SERPL-SCNC: 24 MMOL/L (ref 23–29)
CREAT SERPL-MCNC: 0.7 MG/DL (ref 0.5–1.4)
DIFFERENTIAL METHOD BLD: ABNORMAL
EOSINOPHIL # BLD AUTO: 0.1 K/UL (ref 0–0.5)
EOSINOPHIL NFR BLD: 2.2 % (ref 0–8)
ERYTHROCYTE [DISTWIDTH] IN BLOOD BY AUTOMATED COUNT: 12.1 % (ref 11.5–14.5)
EST. GFR  (NO RACE VARIABLE): >60 ML/MIN/1.73 M^2
GLUCOSE SERPL-MCNC: 102 MG/DL (ref 70–110)
HCT VFR BLD AUTO: 41.2 % (ref 37–48.5)
HGB BLD-MCNC: 13.9 G/DL (ref 12–16)
IMM GRANULOCYTES # BLD AUTO: 0.02 K/UL (ref 0–0.04)
IMM GRANULOCYTES NFR BLD AUTO: 0.3 % (ref 0–0.5)
LYMPHOCYTES # BLD AUTO: 1.2 K/UL (ref 1–4.8)
LYMPHOCYTES NFR BLD: 20.4 % (ref 18–48)
MCH RBC QN AUTO: 28 PG (ref 27–31)
MCHC RBC AUTO-ENTMCNC: 33.7 G/DL (ref 32–36)
MCV RBC AUTO: 83 FL (ref 82–98)
MONOCYTES # BLD AUTO: 0.5 K/UL (ref 0.3–1)
MONOCYTES NFR BLD: 8.5 % (ref 4–15)
NEUTROPHILS # BLD AUTO: 4.1 K/UL (ref 1.8–7.7)
NEUTROPHILS NFR BLD: 67.9 % (ref 38–73)
NRBC BLD-RTO: 0 /100 WBC
PLATELET # BLD AUTO: 170 K/UL (ref 150–450)
PMV BLD AUTO: 9.1 FL (ref 9.2–12.9)
POTASSIUM SERPL-SCNC: 3.9 MMOL/L (ref 3.5–5.1)
PROT SERPL-MCNC: 7.4 G/DL (ref 6–8.4)
RBC # BLD AUTO: 4.97 M/UL (ref 4–5.4)
SODIUM SERPL-SCNC: 138 MMOL/L (ref 136–145)
WBC # BLD AUTO: 6.02 K/UL (ref 3.9–12.7)

## 2024-02-03 PROCEDURE — 85025 COMPLETE CBC W/AUTO DIFF WBC: CPT

## 2024-02-03 PROCEDURE — 80053 COMPREHEN METABOLIC PANEL: CPT

## 2024-02-03 PROCEDURE — 96372 THER/PROPH/DIAG INJ SC/IM: CPT

## 2024-02-03 PROCEDURE — 99285 EMERGENCY DEPT VISIT HI MDM: CPT | Mod: 25

## 2024-02-03 PROCEDURE — 63600175 PHARM REV CODE 636 W HCPCS

## 2024-02-03 RX ORDER — METHYLPREDNISOLONE 4 MG/1
TABLET ORAL
Qty: 21 EACH | Refills: 0 | Status: SHIPPED | OUTPATIENT
Start: 2024-02-03

## 2024-02-03 RX ORDER — NAPROXEN 500 MG/1
500 TABLET ORAL 2 TIMES DAILY PRN
Qty: 12 TABLET | Refills: 0 | Status: SHIPPED | OUTPATIENT
Start: 2024-02-03 | End: 2024-02-09

## 2024-02-03 RX ORDER — KETOROLAC TROMETHAMINE 30 MG/ML
30 INJECTION, SOLUTION INTRAMUSCULAR; INTRAVENOUS
Status: COMPLETED | OUTPATIENT
Start: 2024-02-03 | End: 2024-02-03

## 2024-02-03 RX ADMIN — KETOROLAC TROMETHAMINE 30 MG: 30 INJECTION, SOLUTION INTRAMUSCULAR; INTRAVENOUS at 04:02

## 2024-02-03 NOTE — ED PROVIDER NOTES
"Encounter Date: 2/3/2024       History     Chief Complaint   Patient presents with    Leg Pain     Pt reports redness and pain to left leg starting this AM, pt reports had a "closure fit" procedure with [facility] x 2 weeks ago     This note is dictated on M*Modal word recognition program.  There are word recognition mistakes and grammatical errors that are occasionally missed on review.     Vikki Hubbard is a 63 y.o. female presents to ER today with complaints of left lower leg redness, swelling patient reports she had a closure fit procedure at [facility] to repair a leaky vein in her left lower extremity.  Patient reports he was procedure was approximately 2 weeks ago.  Patient reports for the last few days she has been having pain/redness/swelling to her left lower leg.  Patient is concerned she may have developed a DVT.    The history is provided by the patient.     Review of patient's allergies indicates:  No Known Allergies  Past Medical History:   Diagnosis Date    Thyroid disease      History reviewed. No pertinent surgical history.  Family History   Problem Relation Age of Onset    Ovarian cancer Paternal Grandmother     Breast cancer Neg Hx     Colon cancer Neg Hx      Social History     Tobacco Use    Smoking status: Never    Smokeless tobacco: Never   Substance Use Topics    Alcohol use: Yes     Comment: occ    Drug use: Never     Review of Systems   Constitutional: Negative.    HENT: Negative.     Eyes: Negative.    Respiratory: Negative.     Cardiovascular:  Positive for leg swelling.   Gastrointestinal: Negative.    Endocrine: Negative.    Genitourinary: Negative.    Musculoskeletal: Negative.    Skin:  Positive for color change.   Allergic/Immunologic: Negative.    Neurological: Negative.    Hematological: Negative.    Psychiatric/Behavioral: Negative.         Physical Exam     Initial Vitals [02/03/24 1423]   BP Pulse Resp Temp SpO2   118/87 (!) 116 18 98.1 °F (36.7 °C) 97 %      MAP       --   "       Physical Exam    Constitutional: She appears well-developed and well-nourished. She is not diaphoretic. No distress.   HENT:   Right Ear: External ear normal.   Left Ear: External ear normal.   Eyes: Pupils are equal, round, and reactive to light. Right eye exhibits no discharge. Left eye exhibits no discharge. No scleral icterus.   Neck: Neck supple.   Cardiovascular:  Normal rate.           No murmur heard.  Pulmonary/Chest: Breath sounds normal. No respiratory distress. She has no wheezes. She has no rhonchi. She has no rales. She exhibits no tenderness.   Abdominal: Abdomen is soft.   Musculoskeletal:         General: Tenderness and edema present.      Cervical back: Neck supple.     Neurological: She is alert and oriented to person, place, and time. GCS score is 15. GCS eye subscore is 4. GCS verbal subscore is 5. GCS motor subscore is 6.   Skin: Capillary refill takes less than 2 seconds. There is erythema.   Patient has mild swelling to left lower extremity.  Patient does have some redness that follows a vein to her left medial leg.  Patient also has tenderness to left lower leg.   Psychiatric: She has a normal mood and affect. Her behavior is normal. Thought content normal.               ED Course   Procedures  Labs Reviewed   CBC W/ AUTO DIFFERENTIAL - Abnormal; Notable for the following components:       Result Value    MPV 9.1 (*)     All other components within normal limits   COMPREHENSIVE METABOLIC PANEL          Imaging Results               US Lower Extremity Veins Left (Final result)  Result time 02/03/24 15:40:23      Final result by Adalberto Parsons MD (02/03/24 15:40:23)                   Impression:      Near occlusive intraluminal thrombus throughout the greater saphenous vein.    This report was flagged in Epic as abnormal.      Electronically signed by: Adalberto Parsons  Date:    02/03/2024  Time:    15:40               Narrative:    EXAMINATION:  US LOWER EXTREMITY VEINS  LEFT    CLINICAL HISTORY:  Other specified soft tissue disorders    TECHNIQUE:  Duplex and color flow Doppler evaluation and graded compression of the left lower extremity veins was performed.    COMPARISON:  None    FINDINGS:  Left thigh veins: The common femoral, femoral, popliteal, and deep femoral veins are patent and free of thrombus. The veins are normally compressible and have normal phasic flow and augmentation response.  There is near occlusive intraluminal thrombus throughout the greater saphenous vein starting 3 cm distal from the junction with the common femoral vein.    Left calf veins: The visualized calf veins are patent.    Contralateral CFV: The contralateral (right) common femoral vein is patent and free of thrombus.    Miscellaneous: None                                       Medications   ketorolac injection 30 mg (30 mg Intramuscular Given 2/3/24 9994)     Medical Decision Making  Differential diagnosis includes cellulitis, DVT, phlebitis    Ultrasound lower extremity reveals Near occlusive intraluminal thrombus throughout the greater saphenous vein.  This is consistent with ChloraPrep procedure.  I spoke with [facility] cardiology on-call staff via telephone.  Discussed patient case.  Instructed to start patient on NSAIDs/steroid and have patient follow-up with provider who performed closure procedure.  Great saphenous vein is superficial vein.  Patient does not have DVT.  Left lower extremity is neurovascular intact pedal pulses 2+.  Capillary refill less than 2 seconds to each of 5 toes of left foot.  CBC and CMP within normal limits.  Patient's symptoms are consistent with thrombophlebitis secondary to closure procedure.  Patient stable at time of discharge in no acute distress.  No life-threatening illnesses were found during ER visit today.  Patient was instructed to follow-up with PCP or other recommended specialist within the next 48-72 hours.  Patient was instructed to return to ER immediately  for any worsening or concerning symptoms.  All discharge instructions discussed with patient, and patient agrees to comply with discharge instructions given today.     Amount and/or Complexity of Data Reviewed  Labs: ordered.                                      Clinical Impression:  Final diagnoses:  [M79.89] Left leg swelling  [I80.3] Phlebitis of left leg (Primary)          ED Disposition Condition    Discharge Stable          ED Prescriptions       Medication Sig Dispense Start Date End Date Auth. Provider    methylPREDNISolone (MEDROL DOSEPACK) 4 mg tablet use as directed 21 each 2/3/2024 -- Shukri Guy NP    naproxen (NAPROSYN) 500 MG tablet Take 1 tablet (500 mg total) by mouth 2 (two) times daily as needed (pain). 12 tablet 2/3/2024 2/9/2024 Shukri Guy NP          Follow-up Information       Follow up With Specialties Details Why Contact Info    Sunny Sharma MD Family Medicine   [address]  [facility] [address]  [phone]               Shukri Guy NP  02/03/24 0054

## 2024-02-03 NOTE — Clinical Note
"Vikki"Zenia Hubbard was seen and treated in our emergency department on 2/3/2024.  She may return to work on 02/05/2024.       If you have any questions or concerns, please don't hesitate to call.      Shukri Guy, NP"

## 2024-03-29 ENCOUNTER — HOSPITAL ENCOUNTER (EMERGENCY)
Facility: HOSPITAL | Age: 64
Discharge: HOME | End: 2024-03-29
Attending: STUDENT IN AN ORGANIZED HEALTH CARE EDUCATION/TRAINING PROGRAM
Payer: MEDICARE

## 2024-03-29 VITALS
DIASTOLIC BLOOD PRESSURE: 67 MMHG | SYSTOLIC BLOOD PRESSURE: 132 MMHG | HEART RATE: 91 BPM | OXYGEN SATURATION: 100 % | RESPIRATION RATE: 18 BRPM | WEIGHT: 142.19 LBS | TEMPERATURE: 98 F | BODY MASS INDEX: 23.66 KG/M2

## 2024-03-29 DIAGNOSIS — U07.1 COVID: ICD-10-CM

## 2024-03-29 PROCEDURE — 99284 EMERGENCY DEPT VISIT MOD MDM: CPT | Mod: 25

## 2024-03-29 PROCEDURE — 96372 THER/PROPH/DIAG INJ SC/IM: CPT | Performed by: STUDENT IN AN ORGANIZED HEALTH CARE EDUCATION/TRAINING PROGRAM

## 2024-03-29 PROCEDURE — 63600175 PHARM REV CODE 636 W HCPCS: Performed by: STUDENT IN AN ORGANIZED HEALTH CARE EDUCATION/TRAINING PROGRAM

## 2024-03-29 RX ORDER — KETOROLAC TROMETHAMINE 30 MG/ML
15 INJECTION, SOLUTION INTRAMUSCULAR; INTRAVENOUS
Status: COMPLETED | OUTPATIENT
Start: 2024-03-29 | End: 2024-03-29

## 2024-03-29 RX ADMIN — KETOROLAC TROMETHAMINE 15 MG: 30 INJECTION, SOLUTION INTRAMUSCULAR; INTRAVENOUS at 10:03

## 2024-03-29 NOTE — ED TRIAGE NOTES
63 y.o. female presents to ER Room/bed info not found   Chief Complaint   Patient presents with    COVID-19 Concerns   .   Pt reportss dx'd with COVID on Wednesday and still feeling bad

## 2024-03-29 NOTE — ED NOTES
Assumed care of patient.    63 y.o. female presents to ER ED 03 /ED 03B   Chief Complaint   Patient presents with    COVID-19 Concerns    as per triage nurse.      Pt stable. No acute distress noted. Denies any complaints.    Call bell within reach.  Comfort measures provided.   Discussed plan of care. With verbal understanding.  Denies any further needs at present.     Alert, awake, and oriented.  Airway is open and patent.   Spontaneous respirations with normal respiratory effort and rate.  Bilateral breath sounds clear.   Pulses equal bilaterally with a regular rate and rhythm.   Abdomen soft, nontender.   Skin is warm and dry with normal skin turgor  Mucous membranes are moist  Muscle strength equal bilaterally.

## 2024-03-29 NOTE — DISCHARGE INSTRUCTIONS
Please follow up with your primary care physician within 2 days. Ensure that you review all lab work results and imaging results. If you have any questions about your discharge paperwork please call the Emergency Department.  PLEASE SPEAK WITH YOUR PRIMARY CARE PHYSICIAN REGARDING EVALUATION FOR A POSSIBLE PAXLOVID PRESCRIPTION.    Return to the Emergency Department for any fevers, worsening cough or congestion, shortness of breath, chest pain, nausea, vomiting, diarrhea, if symptoms do not improve, or for any new or worsening symptoms, unless otherwise told.  If you have been discharged pending a COVID test, please isolate as per the instructions given to you, and follow-up using the MyChart instructions in your discharge paperwork.  If you test positive, please contact the INFUSION SITE AT [facility] ([phone]) and schedule your infusion appointment, if you qualify.    Thank you for visiting [facility], Department of Emergency Medicine. Please see the entirety of the educational materials provided. Please note that a visit to the emergency department does not substitute ongoing care from a primary medical provider or specialist. Please ensure to follow up as recommended. However, please return to the emergency department immediately if symptoms do not improve as discussed, symptoms worsen, new symptoms develop, difficulty in following up or for any of your concerns or issues. Please note on discharge you are acknowledging understanding and agreement on medical evaluation, management recommendations and follow up recommendations.        If you have a COVID Test PENDING:    Please access [facility] to review the results of your test. Until the results of your COVID test return, you should isolate yourself so as not to potentially spread illness to others.    If your COVID test returns positive, you should isolate yourself so as not to spread illness to others. After five full days, if you  are feeling better and you have not had fever for 24 hours, you can return to your typical daily activities, but you must wear a mask around others for an additional 5 days.    If your COVID test returns negative and you are either unvaccinated or more than six months out from your two-dose vaccine and are not yet boosted, you should still quarantine for 5 full days followed by strict mask use for an additional 5 full days.    If your COVID test returns negative and you have received your 2-dose initial vaccine as well as a booster, you should continue strict mask use for 10 full days after the exposure.    For all those exposed, best practice includes a test at day 5 after the exposure. This can be a home test or a test through one of the many testing centers throughout our community.    Masking is always advised to limit the spread of COVID. Cdc.gov is an excellent site to obtain the latest up to date recommendations regarding COVID and COVID testing.         After your evaluation today, we ruled out any emergent condition. However, if you develop shortness of breath, chest pain, or ANY OTHER CONCERNS please return to the emergency department for further care.         CDC Testing and Quarantine Guidelines for patients with exposure to a known-positive COVID-19 person:    A close exposure is defined as anyone who has had an exposure (masked or unmasked) to a known COVID -19 positive person within 6 feet of someone for a cumulative total of 15 minutes or more over a 24-hour period.    Vaccinated and/or if you recently had a positive covid test within 90 days do NOT need to quarantine after contact with someone who had COVID-19 unless you develop symptoms.    Fully vaccinated people who have not had a positive test within 90 days, should get tested 3-5 days after their exposure, even if they don't have symptoms and wear a mask indoors in public for 14 days following exposure or until their test result is  negative.         Unvaccinated and/or NOT had a positive test within 90 days and meet close exposure    You are required by CDC guidelines to quarantine for at least 5 days from time of exposure followed by 5 days of strict masking. It is recommended, but not required to test after 5 days, unless you develop symptoms, in which case you should test at that time.    If you get tested after 5 days and your test is positive, your 5 day period of isolation starts the day of the positive test.     If your exposure does not meet the above definition, you can return to your normal daily activities to include social distancing, wearing a mask and frequent handwashing.         Here is a link to guidance from the CDC:    https://www.cdc.gov/media/releases/2021/s1227-isolation-quarantine-guidance.html      [facility]  Health Testing Sites:    https://ldh.[place].gov/page/3934      [facility] website with testing locations and guidance:    [email or web address]

## 2024-03-29 NOTE — ED PROVIDER NOTES
Encounter Date: 3/29/2024       History     Chief Complaint   Patient presents with    COVID-19 Concerns     63-year-old female with history of thyroid disease presenting with cough, congestion, body aches.  Patient was diagnosed with COVID two days ago, and states that she is continued to feel worse.  Took Tylenol this morning with minimal improvement.  No ibuprofen.  Chest pain and shortness breath when she coughs.  No other complaints.      Review of patient's allergies indicates:  No Known Allergies  Past Medical History:   Diagnosis Date    Thyroid disease      No past surgical history on file.  Family History   Problem Relation Age of Onset    Ovarian cancer Paternal Grandmother     Breast cancer Neg Hx     Colon cancer Neg Hx      Social History     Tobacco Use    Smoking status: Never    Smokeless tobacco: Never   Substance Use Topics    Alcohol use: Yes     Comment: occ    Drug use: Never     Review of Systems   Constitutional:  Negative for fever.   HENT:  Positive for congestion. Negative for sore throat.    Respiratory:  Positive for cough. Negative for shortness of breath.    Cardiovascular:  Negative for chest pain.   Gastrointestinal:  Negative for nausea.   Genitourinary:  Negative for dysuria.   Musculoskeletal:  Positive for myalgias. Negative for back pain.   Skin:  Negative for rash.   Neurological:  Negative for weakness.   Hematological:  Does not bruise/bleed easily.       Physical Exam     Initial Vitals [03/29/24 1011]   BP Pulse Resp Temp SpO2   (!) 141/63 107 20 97.9 °F (36.6 °C) 98 %      MAP       --         Physical Exam    Nursing note and vitals reviewed.  Constitutional: She appears well-developed.   HENT:   Head: Normocephalic.   Eyes: Pupils are equal, round, and reactive to light.   Neck:   Normal range of motion.  Cardiovascular:            No murmur heard.  Pulmonary/Chest: No respiratory distress.   Clear lungs bilaterally.  No respiratory distress.  No wheezing or rales.  Good  air movement.     Abdominal: Abdomen is soft.   Musculoskeletal:         General: No edema.      Cervical back: Normal range of motion.     Neurological: She is alert.   Skin: Skin is warm.   Psychiatric: She has a normal mood and affect.         ED Course   Procedures  Labs Reviewed - No data to display       Imaging Results              X-Ray Chest PA And Lateral (Final result)  Result time 03/29/24 10:40:48      Final result by Jayla Cee MD (03/29/24 10:40:48)                   Impression:      No acute abnormality.      Electronically signed by: Jayla Cee MD  Date:    03/29/2024  Time:    10:40               Narrative:    EXAMINATION:  XR CHEST PA AND LATERAL    CLINICAL HISTORY:  COVID-19    TECHNIQUE:  PA and lateral views of the chest were performed.    COMPARISON:  None    FINDINGS:  The lungs are clear, with normal appearance of pulmonary vasculature and no pleural effusion or pneumothorax.    The cardiac silhouette is normal in size. The hilar and mediastinal contours are unremarkable.    Bones are intact.                                       Medications   ketorolac injection 15 mg (15 mg Intramuscular Given 3/29/24 1024)     Medical Decision Making  DDX:  Patient diagnosed with COVID two days ago.  Vitals within normal limits, will rule out pneumonia as cause of discomfort.  Will treat with NSAID for symptomatic improvement.  DX:  X-ray  TX:  Toradol  Dispo:  Discharge with strict return precautions.  Will ensure the patient has pulse ox at home to measure O2 level.          Amount and/or Complexity of Data Reviewed  Radiology: ordered.    Risk  Prescription drug management.               ED Course as of 03/29/24 1235   Fri Mar 29, 2024   1113 Guillermo Saab 11:14 AM  Symptoms improved. Discussed results, findings, supportive care, follow up recommendations, and return to ED precautions with this patient. Patient verbalized understanding and agreement.  Patient is stable for discharge  at this time.   [NB]      ED Course User Index  [NB] Guillermo Saab MD                           Clinical Impression:  Final diagnoses:  [U07.1] COVID          ED Disposition Condition    Discharge Stable          ED Prescriptions    None       Follow-up Information       Follow up With Specialties Details Why Contact Info    Sunny Sharma MD Family Medicine Schedule an appointment as soon as possible for a visit in 2 days  [address]  [facility]  [place] [id number] [phone]      [facility] - Emergency Dept Emergency Medicine  If symptoms worsen [address] [facility] [address]  [phone]             Guillermo Saab MD  03/29/24 1022       Guillermo Saab MD  03/29/24 1234

## 2024-06-18 ENCOUNTER — LAB VISIT (OUTPATIENT)
Dept: LAB | Facility: HOSPITAL | Age: 64
End: 2024-06-18
Attending: INTERNAL MEDICINE
Payer: MEDICARE

## 2024-06-18 DIAGNOSIS — E03.9 HYPOTHYROIDISM: ICD-10-CM

## 2024-06-18 DIAGNOSIS — E53.8 B12 DEFICIENCY: ICD-10-CM

## 2024-06-18 LAB
T4 SERPL-MCNC: 10.7 UG/DL (ref 4.5–11.5)
TSH SERPL DL<=0.005 MIU/L-ACNC: 1.51 UIU/ML (ref 0.4–4)
VIT B12 SERPL-MCNC: 483 PG/ML (ref 210–950)

## 2024-06-18 PROCEDURE — 36415 COLL VENOUS BLD VENIPUNCTURE: CPT | Performed by: INTERNAL MEDICINE

## 2024-06-18 PROCEDURE — 84436 ASSAY OF TOTAL THYROXINE: CPT | Performed by: INTERNAL MEDICINE

## 2024-06-18 PROCEDURE — 84443 ASSAY THYROID STIM HORMONE: CPT | Performed by: INTERNAL MEDICINE

## 2024-06-18 PROCEDURE — 82607 VITAMIN B-12: CPT | Performed by: INTERNAL MEDICINE

## 2024-12-04 ENCOUNTER — LAB VISIT (OUTPATIENT)
Dept: LAB | Facility: HOSPITAL | Age: 64
End: 2024-12-04
Attending: INTERNAL MEDICINE
Payer: MEDICARE

## 2024-12-04 DIAGNOSIS — E05.90 HYPERTHYROIDISM: Primary | ICD-10-CM

## 2024-12-04 DIAGNOSIS — E55.9 VITAMIN D DEFICIENCY: ICD-10-CM

## 2024-12-04 LAB
25(OH)D3+25(OH)D2 SERPL-MCNC: 49 NG/ML (ref 30–96)
ANION GAP SERPL CALC-SCNC: 10 MMOL/L (ref 8–16)
CHLORIDE SERPL-SCNC: 105 MMOL/L (ref 95–110)
CO2 SERPL-SCNC: 26 MMOL/L (ref 23–29)
MAGNESIUM SERPL-MCNC: 1.9 MG/DL (ref 1.6–2.6)
POTASSIUM SERPL-SCNC: 4.4 MMOL/L (ref 3.5–5.1)
SODIUM SERPL-SCNC: 141 MMOL/L (ref 136–145)
T4 SERPL-MCNC: 9.9 UG/DL (ref 4.5–11.5)
TSH SERPL DL<=0.005 MIU/L-ACNC: 1.95 UIU/ML (ref 0.4–4)

## 2024-12-04 PROCEDURE — 83735 ASSAY OF MAGNESIUM: CPT | Performed by: INTERNAL MEDICINE

## 2024-12-04 PROCEDURE — 84443 ASSAY THYROID STIM HORMONE: CPT | Performed by: INTERNAL MEDICINE

## 2024-12-04 PROCEDURE — 36415 COLL VENOUS BLD VENIPUNCTURE: CPT | Performed by: INTERNAL MEDICINE

## 2024-12-04 PROCEDURE — 82306 VITAMIN D 25 HYDROXY: CPT | Performed by: INTERNAL MEDICINE

## 2024-12-04 PROCEDURE — 80051 ELECTROLYTE PANEL: CPT | Performed by: INTERNAL MEDICINE

## 2024-12-04 PROCEDURE — 84436 ASSAY OF TOTAL THYROXINE: CPT | Performed by: INTERNAL MEDICINE

## 2025-02-07 ENCOUNTER — TELEPHONE (OUTPATIENT)
Dept: OBSTETRICS AND GYNECOLOGY | Facility: CLINIC | Age: 65
End: 2025-02-07
Payer: MEDICARE

## 2025-02-07 NOTE — TELEPHONE ENCOUNTER
----- Message from Sabi sent at 2/7/2025  3:33 PM CST -----  Contact: Self  Vikki Hubbard  MRN: [id number]  Home Phone      [phone]  Work Phone      Not on file.  Mobile          [phone]    Patient Care Team:  Sunny Sharma MD as PCP - General (Family Medicine)  Muriel Conteh MD as Consulting Physician (Obstetrics and Gynecology)  OB? No  What phone number can you be reached at? [phone]  Message: pt states she has a lump in her right armpit. Pt will be a NP and requested Dr Conteh

## 2025-02-10 ENCOUNTER — TELEPHONE (OUTPATIENT)
Dept: OBSTETRICS AND GYNECOLOGY | Facility: CLINIC | Age: 65
End: 2025-02-10
Payer: MEDICARE

## 2025-02-10 NOTE — TELEPHONE ENCOUNTER
----- Message from Sabi sent at 2/10/2025 11:50 AM CST -----  Contact: Self  Vikki Hubbard  MRN: [id number]  Home Phone      [phone]  Work Phone      Not on file.  Mobile          [phone]    Patient Care Team:  Sunny Sharma MD as PCP - General (Family Medicine)  Muriel Conteh MD as Consulting Physician (Obstetrics and Gynecology)  OB? No  What phone number can you be reached at? [phone]  Message: returning Kush's call from Friday

## 2025-02-10 NOTE — TELEPHONE ENCOUNTER
Patient was contacted and scheduled with Dr. England, for tomorrow, to address the small bump, that patient said she found. Also scheduled her for her WWE with Dr. Conteh. She was given appointment dates and times and verbalized understanding.

## 2025-02-11 ENCOUNTER — OFFICE VISIT (OUTPATIENT)
Dept: OBSTETRICS AND GYNECOLOGY | Facility: CLINIC | Age: 65
End: 2025-02-11
Payer: MEDICARE

## 2025-02-11 VITALS
HEART RATE: 98 BPM | SYSTOLIC BLOOD PRESSURE: 128 MMHG | HEIGHT: 65 IN | BODY MASS INDEX: 25.42 KG/M2 | DIASTOLIC BLOOD PRESSURE: 72 MMHG | WEIGHT: 152.56 LBS

## 2025-02-11 DIAGNOSIS — R59.0 ENLARGED LYMPH NODES IN ARMPIT: Primary | ICD-10-CM

## 2025-02-11 PROCEDURE — 99213 OFFICE O/P EST LOW 20 MIN: CPT | Mod: PBBFAC | Performed by: OBSTETRICS & GYNECOLOGY

## 2025-02-11 PROCEDURE — 99999 PR STA SHADOW: CPT | Mod: PBBFAC,,, | Performed by: OBSTETRICS & GYNECOLOGY

## 2025-02-11 PROCEDURE — 99999 PR PBB SHADOW E&M-EST. PATIENT-LVL III: CPT | Mod: PBBFAC,,, | Performed by: OBSTETRICS & GYNECOLOGY

## 2025-02-11 PROCEDURE — 99213 OFFICE O/P EST LOW 20 MIN: CPT | Mod: S$PBB | Performed by: OBSTETRICS & GYNECOLOGY

## 2025-02-11 NOTE — PROGRESS NOTES
Subjective:       Patient ID: Vikki Hubbard is a 64 y.o. female.    Chief Complaint:  lump under armpit (Pt here c/o lump under R armpit, she denies pain and redness)      History of Present Illness  Patient presents complaining a lump in her right armpit.  Patient states it has been there little over a month.  Patient states it is not tender unless she pushes hard on it.  She states it has not changed since its discovery.  Patient reports that she does have a scheduled mammogram in the very near future    Menstrual History:  OB History          4    Para   4    Term   4            AB        Living             SAB        IAB        Ectopic        Multiple        Live Births                    Menarche age:  No LMP recorded. Patient is postmenopausal.         Review of Systems  Review of Systems   Constitutional:  Negative for activity change, appetite change, chills, diaphoresis, fatigue, fever and unexpected weight change.   HENT:  Negative for congestion, dental problem, drooling, ear discharge, ear pain, facial swelling, hearing loss, mouth sores, nosebleeds, postnasal drip, rhinorrhea, sinus pressure, sneezing, sore throat, tinnitus, trouble swallowing and voice change.    Eyes:  Negative for photophobia, pain, discharge, redness, itching and visual disturbance.   Respiratory:  Negative for apnea, cough, choking, chest tightness, shortness of breath, wheezing and stridor.    Cardiovascular:  Negative for chest pain, palpitations and leg swelling.   Gastrointestinal:  Negative for abdominal distention, abdominal pain, anal bleeding, blood in stool, constipation, diarrhea, nausea, rectal pain and vomiting.   Endocrine: Negative for cold intolerance, heat intolerance, polydipsia, polyphagia and polyuria.   Genitourinary:  Negative for decreased urine volume, difficulty urinating, dyspareunia, dysuria, enuresis, flank pain, frequency, genital sores, hematuria, menstrual problem, pelvic pain, urgency,  vaginal bleeding, vaginal discharge and vaginal pain.   Musculoskeletal:  Negative for arthralgias, back pain, gait problem, joint swelling, myalgias, neck pain and neck stiffness.   Skin:  Negative for color change, pallor, rash and wound.   Allergic/Immunologic: Negative for environmental allergies, food allergies and immunocompromised state.   Neurological:  Negative for dizziness, tremors, seizures, syncope, facial asymmetry, speech difficulty, weakness, light-headedness, numbness and headaches.   Hematological:  Negative for adenopathy. Does not bruise/bleed easily.   Psychiatric/Behavioral:  Negative for agitation, behavioral problems, confusion, decreased concentration, dysphoric mood, hallucinations, self-injury, sleep disturbance and suicidal ideas. The patient is not nervous/anxious and is not hyperactive.            Objective:      Physical Exam  Vitals and nursing note reviewed.   Chest:                 Assessment:        1. Enlarged lymph nodes in armpit                Plan:         Vikki was seen today for lump under armpit.    Diagnoses and all orders for this visit:    Enlarged lymph nodes in armpit

## 2025-02-14 DIAGNOSIS — M54.12 RADICULOPATHY, CERVICAL REGION: Primary | ICD-10-CM

## 2025-02-19 ENCOUNTER — CLINICAL SUPPORT (OUTPATIENT)
Dept: REHABILITATION | Facility: HOSPITAL | Age: 65
End: 2025-02-19
Payer: MEDICARE

## 2025-02-19 DIAGNOSIS — M54.12 RADICULOPATHY, CERVICAL REGION: Primary | ICD-10-CM

## 2025-02-19 DIAGNOSIS — G56.03 CARPAL TUNNEL SYNDROME, BILATERAL UPPER LIMBS: Primary | ICD-10-CM

## 2025-02-19 PROCEDURE — 97140 MANUAL THERAPY 1/> REGIONS: CPT | Mod: PN

## 2025-02-19 PROCEDURE — 97110 THERAPEUTIC EXERCISES: CPT | Mod: PN

## 2025-02-19 PROCEDURE — 97161 PT EVAL LOW COMPLEX 20 MIN: CPT | Mod: PN

## 2025-02-19 NOTE — PROGRESS NOTES
Outpatient Rehab    Physical Therapy Evaluation    Patient Name: Vikki Hubbard  MRN: [id number]  YOB: 1960  Today's Date: 2/19/2025    Therapy Diagnosis:   Encounter Diagnosis   Name Primary?    Radiculopathy, cervical region Yes     Physician: Raffaele Collins, *    Physician Orders: Eval and Treat  Medical Diagnosis: M54.12 (ICD-10-CM) - Radiculopathy, cervical region    Visit #  1 / 12   Visits Authorized:  1 / 1   Date of Evaluation:  2/19/2025   Insurance Authorization Period: 2/14/25 to 2/14/26  Plan of Care Certification:  2/19/2025 to 4/4/25      Time In: 1015   Time Out: 1100  Total Time: 45   Total Billable Time: 45    Intake Outcome Measure for FOTO Survey    Therapist reviewed FOTO scores for Vikki Hubbard on 2/19/2025.   FOTO report - see Media section or FOTO account episode details.     Intake Score: 50%         Subjective   History of Present Illness  Vikki is a 64 y.o. female who reports to physical therapy with a chief concern of PAIN IN THE NECK, ONLY THE MIDDLE TO THE RIGHT; 2 YEARS OF RIGHT ARM AND HAND IRRITATION, LAST [place]RAYS [place] [address]. According to the patient's chart, Vikki has a past medical history of Thyroid disease. Vikki has no past surgical history on file.    The patient reports a medical diagnosis of D.                 Pain     Patient reports a current pain level of 2/10.        Pain in the right hand and arm has bothered her for 3 years. Tightness in the neck has bothered her for about a year.        Past Medical History/Physical Systems Review:   Vikki Hubbard  has a past medical history of Thyroid disease.    Vikki Hubbard  has no past surgical history on file.    Vikki has a current medication list which includes the following prescription(s): estradiol, levothyroxine, and methylprednisolone.    Review of patient's allergies indicates:  No Known Allergies     Objective            Treatment:  Therapeutic Exercise  Therapeutic Exercise Activity 1: Cardinal planes, AROM  stretching of the neck; 1x10 each, 5 sec hold    Manual Therapy  Manual Therapy Activity 1: passive manual traction, supine, 3x, 15 sec hold    Modalities  Moist Heat (min): 10 minutes to the neck, seated, clip to secure    Assessment & Plan        Patient's spiritual, cultural, and educational needs considered and patient agreeable to plan of care and goals.           Goals:   Active       Functional outcome       Patient stated goal:          Start:  02/19/25               Pain       Patient will report pain of 1/10 demonstrating a reduction of overall pain       Start:  02/19/25    Expected End:  03/05/25            Patient will report a 2 point reduction in pain while performing lifting, carrying activity.       Start:  02/19/25    Expected End:  04/02/25               Range of Motion       Patient will achieve bilateral cervical side bending ROM WNL       Start:  02/19/25    Expected End:  04/02/25            Patient will achieve bilateral cervical rotation ROM WNL       Start:  02/19/25    Expected End:  04/02/25            Patient will achieve cervical flexion ROM WNL       Start:  02/19/25    Expected End:  04/02/25            Patient will achieve cervical extension ROM WNL       Start:  02/19/25    Expected End:  04/02/25            Patient will achieve full cervical retraction       Start:  02/19/25    Expected End:  04/02/25            Patient will achieve full cervical protraction       Start:  02/19/25    Expected End:  04/02/25                Seth Jade PT

## 2025-02-28 ENCOUNTER — CLINICAL SUPPORT (OUTPATIENT)
Dept: REHABILITATION | Facility: HOSPITAL | Age: 65
End: 2025-02-28
Payer: MEDICARE

## 2025-02-28 DIAGNOSIS — M54.12 RADICULOPATHY, CERVICAL REGION: Primary | ICD-10-CM

## 2025-02-28 PROCEDURE — 97110 THERAPEUTIC EXERCISES: CPT | Mod: PN

## 2025-02-28 PROCEDURE — 97140 MANUAL THERAPY 1/> REGIONS: CPT | Mod: PN

## 2025-02-28 PROCEDURE — 20560 NDL INSJ W/O NJX 1 OR 2 MUSC: CPT | Mod: PN

## 2025-02-28 PROCEDURE — 97112 NEUROMUSCULAR REEDUCATION: CPT | Mod: PN

## 2025-02-28 NOTE — PROGRESS NOTES
"  Outpatient Rehab    Physical Therapy Visit    Patient Name: Vikki Hubbard  MRN: [id number]  YOB: 1960  Encounter Date: 2/28/2025    Therapy Diagnosis:   Encounter Diagnosis   Name Primary?    Radiculopathy, cervical region Yes     Physician: Raffaele Collins, *    Physician Orders: Eval and Treat  Medical Diagnosis: M54.12 (ICD-10-CM) - Radiculopathy, cervical region     Visit #  1 / 12   Visits Authorized:  1 / 1   Date of Evaluation:  2/19/2025   Insurance Authorization Period: 2/14/25 to 2/14/26  Plan of Care Certification:  2/19/2025 to 4/4/25      Time In: 0920   Time Out: 1000  Total Time: 40   Total Billable Time: 40    FOTO:  Intake Score:  %  Survey Score 1:  %  Survey Score 2:  %         Subjective   her hand and wrist are her main issue.  Pain reported as 3/10. just feels like a achiness    Objective            Treatment:  Therapeutic Exercise  Therapeutic Exercise Activity 1: Supine shoulder ER 2 x 10 with red tband  Therapeutic Exercise Activity 2: Towel cervical extension x 10  Therapeutic Exercise Activity 3: Towel cervical rotation x 10 ea direction    Manual Therapy  Manual Therapy Activity 1: Pt was educated on Functional Dry Needling. Pt provided written and verbal consent to treatment. Pt received FDN to R paraspinals with 30mm needle. Pt was issued educational handout on what to expect following treatment.    Balance/Neuromuscular Re-Education  Balance/Neuromuscular Re-Education Activity 1: Chin tucks 2 x 10 with 2sec hold  Balance/Neuromuscular Re-Education Activity 2: Open books in sidelying 2 x 10 ea side         Assessment & Plan   Assessment: Tolerated FDN well and had feeling of "lightness" after. Has difficulty with relaxing shoulder muscles with seated activities       Patient will continue to benefit from skilled outpatient physical therapy to address the deficits listed in the problem list box on initial evaluation, provide pt/family education and to maximize pt's level " of independence in the home and community environment.     Patient's spiritual, cultural, and educational needs considered and patient agreeable to plan of care and goals.           Plan: Continue with plan of care as tolerated    Goals:   Active       Functional outcome       Patient stated goal:          Start:  02/19/25               Pain       Patient will report pain of 1/10 demonstrating a reduction of overall pain       Start:  02/19/25    Expected End:  03/05/25            Patient will report a 2 point reduction in pain while performing lifting, carrying activity.       Start:  02/19/25    Expected End:  04/02/25               Range of Motion       Patient will achieve bilateral cervical side bending ROM WNL       Start:  02/19/25    Expected End:  04/02/25            Patient will achieve bilateral cervical rotation ROM WNL       Start:  02/19/25    Expected End:  04/02/25            Patient will achieve cervical flexion ROM WNL       Start:  02/19/25    Expected End:  04/02/25            Patient will achieve cervical extension ROM WNL       Start:  02/19/25    Expected End:  04/02/25            Patient will achieve full cervical retraction       Start:  02/19/25    Expected End:  04/02/25            Patient will achieve full cervical protraction       Start:  02/19/25    Expected End:  04/02/25                Batool Sofia, PT

## 2025-03-05 ENCOUNTER — CLINICAL SUPPORT (OUTPATIENT)
Dept: REHABILITATION | Facility: HOSPITAL | Age: 65
End: 2025-03-05
Payer: MEDICARE

## 2025-03-05 DIAGNOSIS — M54.12 RADICULOPATHY, CERVICAL REGION: Primary | ICD-10-CM

## 2025-03-05 PROCEDURE — 97140 MANUAL THERAPY 1/> REGIONS: CPT | Mod: PN

## 2025-03-05 PROCEDURE — 97110 THERAPEUTIC EXERCISES: CPT | Mod: PN

## 2025-03-05 NOTE — PROGRESS NOTES
Patient improving slowly with pain management. Seems to have obtained greatest relief with dry needling.

## 2025-03-07 ENCOUNTER — CLINICAL SUPPORT (OUTPATIENT)
Dept: REHABILITATION | Facility: HOSPITAL | Age: 65
End: 2025-03-07
Payer: MEDICARE

## 2025-03-07 DIAGNOSIS — M54.2 ACUTE NECK PAIN: Primary | ICD-10-CM

## 2025-03-07 DIAGNOSIS — R20.2 NUMBNESS AND TINGLING OF UPPER EXTREMITY: ICD-10-CM

## 2025-03-07 DIAGNOSIS — R20.0 NUMBNESS AND TINGLING OF UPPER EXTREMITY: ICD-10-CM

## 2025-03-07 DIAGNOSIS — M62.81 HAND MUSCLE WEAKNESS: Primary | ICD-10-CM

## 2025-03-07 PROCEDURE — 97110 THERAPEUTIC EXERCISES: CPT | Mod: PN

## 2025-03-07 PROCEDURE — 97165 OT EVAL LOW COMPLEX 30 MIN: CPT | Mod: PN

## 2025-03-07 PROCEDURE — 97140 MANUAL THERAPY 1/> REGIONS: CPT | Mod: PN

## 2025-03-07 NOTE — PROGRESS NOTES
Outpatient Rehab    Physical Therapy Visit    Patient Name: Vikki Hubbard  MRN: [id number]  YOB: 1960  Encounter Date: 3/7/2025    Therapy Diagnosis: No diagnosis found.  Physician: Raffaele Collins, *    Physician Orders: Eval and Treat  Medical Diagnosis: M54.12 (ICD-10-CM) - Radiculopathy, cervical region    Visit # / Visits Authorized:   /    Date of Evaluation:    Insurance Authorization Period: 2/18/25 to 12/31/25  Plan of Care Certification:   to       Time In: 0800   Time Out: 0841  Total Time: 41   Total Billable Time: 41    FOTO:  Intake Score:  %  Survey Score 1:  %  Survey Score 2:  %         Subjective   right hand still bothering her but wearing the brace is helpful; states that dry needling has helped with relief; admits that she is suffering from an upper respiratory illness but is on the mend; returning to work today.    neck is less painful than usual, right arm is less irritated    Objective            Treatment:  Therapeutic Exercise  Therapeutic Exercise Activity 1: Supine shoulder ER 2 x 10 with red tband  Therapeutic Exercise Activity 3: Towel cervical rotation  1x10 ea direction, 10 sec hold  Therapeutic Exercise Activity 4: chin tucks, 1x10, 10 sec hold  Therapeutic Exercise Activity 5: seated, upper trap stretch, 1x10, 10 sec hold    Manual Therapy  Manual Therapy Activity 1: PT performed manual traction to the cervical spine, patient positioned supine, stretch/relax at 15/10 second intervals    Modalities  Moist Heat (min): 10 minutes to the neck, supine, clip to secure    Assessment & Plan   Assessment: Stretching exercises  were performed well and tolerated better than expected; manual traction provided great relief.  Evaluation/Treatment Tolerance: Patient tolerated treatment well    Patient will continue to benefit from skilled outpatient physical therapy to address the deficits listed in the problem list box on initial evaluation, provide pt/family education and to  maximize pt's level of independence in the home and community environment.     Patient's spiritual, cultural, and educational needs considered and patient agreeable to plan of care and goals.           Plan: Continue with plan of care as tolerated    Goals:   Active       Functional outcome       Patient stated goal:          Start:  02/19/25               Pain       Patient will report pain of 1/10 demonstrating a reduction of overall pain       Start:  02/19/25    Expected End:  03/05/25            Patient will report a 2 point reduction in pain while performing lifting, carrying activity.       Start:  02/19/25    Expected End:  04/02/25               Range of Motion       Patient will achieve bilateral cervical side bending ROM WNL       Start:  02/19/25    Expected End:  04/02/25            Patient will achieve bilateral cervical rotation ROM WNL       Start:  02/19/25    Expected End:  04/02/25            Patient will achieve cervical flexion ROM WNL       Start:  02/19/25    Expected End:  04/02/25            Patient will achieve cervical extension ROM WNL       Start:  02/19/25    Expected End:  04/02/25            Patient will achieve full cervical retraction       Start:  02/19/25    Expected End:  04/02/25            Patient will achieve full cervical protraction       Start:  02/19/25    Expected End:  04/02/25                Seth Jade PT
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
## 2025-03-11 ENCOUNTER — CLINICAL SUPPORT (OUTPATIENT)
Dept: REHABILITATION | Facility: HOSPITAL | Age: 65
End: 2025-03-11
Payer: MEDICARE

## 2025-03-11 DIAGNOSIS — R20.0 NUMBNESS AND TINGLING OF UPPER EXTREMITY: Primary | ICD-10-CM

## 2025-03-11 DIAGNOSIS — M62.81 HAND MUSCLE WEAKNESS: ICD-10-CM

## 2025-03-11 DIAGNOSIS — M54.12 RADICULOPATHY, CERVICAL REGION: Primary | ICD-10-CM

## 2025-03-11 DIAGNOSIS — R20.2 NUMBNESS AND TINGLING OF UPPER EXTREMITY: Primary | ICD-10-CM

## 2025-03-11 PROCEDURE — 97110 THERAPEUTIC EXERCISES: CPT | Mod: PN,CQ

## 2025-03-11 PROCEDURE — 97140 MANUAL THERAPY 1/> REGIONS: CPT | Mod: PN,CQ

## 2025-03-11 PROCEDURE — 97110 THERAPEUTIC EXERCISES: CPT | Mod: PN

## 2025-03-11 NOTE — PROGRESS NOTES
Outpatient Rehab    Physical Therapy Visit    Patient Name: Vikki Hubbard  MRN: [id number]  YOB: 1960  Encounter Date: 3/11/2025    Therapy Diagnosis: No diagnosis found.  Physician: Raffaele Collins, *    Physician Orders: Eval and Treat  Medical Diagnosis: M54.12 (ICD-10-CM) - Radiculopathy, cervical region     Visit # / Visits Authorized:   /    Date of Evaluation:    Insurance Authorization Period: 2/18/25 to 12/31/25  Plan of Care Certification:   to       Time In: 0820   Time Out: 0856  Total Time: 36   Total Billable Time: 36    FOTO:  Intake Score:  %  Survey Score 1:  %  Survey Score 2:  %         Subjective   Pt reports R hand is improving. She states some discomfort R side neck and shoulder girdle..  Pain reported as 5/10.      Objective            Treatment:  Therapeutic Exercise  Therapeutic Exercise Activity 2: Towel cervical extension x 10  Therapeutic Exercise Activity 3: Towel cervical rotation  1x10 ea direction, 10 sec hold  Therapeutic Exercise Activity 4: chin tucks, 1x10, 10 sec hold  Therapeutic Exercise Activity 5: seated, levator trap tretch, 5x10, 10 sec hold    Manual Therapy  Manual Therapy Activity 1: PT performed manual traction to the cervical spine, patient positioned supine, stretch/relax at 15/10 second intervals. F/B Prone STM to R shoulder girdle and UT.       MT x 5' applied to cervical area in supine position prior to MT.     Assessment & Plan   Assessment: Pt reports increase mobility in R shoulder girdle post MT techniques. Modified ex's as needed today to address pt's subjective comments. Good response.       Patient will continue to benefit from skilled outpatient physical therapy to address the deficits listed in the problem list box on initial evaluation, provide pt/family education and to maximize pt's level of independence in the home and community environment.     Patient's spiritual, cultural, and educational needs considered and patient agreeable to  plan of care and goals.           Plan: Continue with plan of care as tolerated    Goals:   Active       Functional outcome       Patient stated goal:          Start:  02/19/25               Pain       Patient will report pain of 1/10 demonstrating a reduction of overall pain       Start:  02/19/25    Expected End:  03/05/25            Patient will report a 2 point reduction in pain while performing lifting, carrying activity.       Start:  02/19/25    Expected End:  04/02/25               Range of Motion       Patient will achieve bilateral cervical side bending ROM WNL       Start:  02/19/25    Expected End:  04/02/25            Patient will achieve bilateral cervical rotation ROM WNL       Start:  02/19/25    Expected End:  04/02/25            Patient will achieve cervical flexion ROM WNL       Start:  02/19/25    Expected End:  04/02/25            Patient will achieve cervical extension ROM WNL       Start:  02/19/25    Expected End:  04/02/25            Patient will achieve full cervical retraction       Start:  02/19/25    Expected End:  04/02/25            Patient will achieve full cervical protraction       Start:  02/19/25    Expected End:  04/02/25                Marianne Swan PTA

## 2025-03-11 NOTE — PROGRESS NOTES
"  Outpatient Rehab    Occupational Therapy Visit    Patient Name: Vikki Hubbard  MRN: [id number]  YOB: 1960  Encounter Date: 3/11/2025    Therapy Diagnosis:   Encounter Diagnoses   Name Primary?    Numbness and tingling of upper extremity Yes    Hand muscle weakness      Physician: Raffaele Collins, *    Physician Orders: Eval and Treat  Medical Diagnosis: bilateral carpal tunnel syndrome     Visit # / Visits Authorized:  eval   Date of Evaluation:  3/7/2025   Insurance Authorization Period: 3/6/2025 - 12/31/2025  Plan of Care Certification:  3/7/2025 to 5/7/2025                Time In: 0900   Time Out: 0942  Total Time: 42   Total Billable Time: 27    FOTO:  Intake Score:  %  Survey Score 1:  %  Survey Score 2:  %         Subjective   "I have been wearing my brace at night and it has really been helping.".  Pain reported as 1/10. "Just some aggravating pain."    Objective           Treatment:  Therapeutic Exercise  Therapeutic Exercise Activity 1: Prayer stretch: 1 x 10 with 5 second hold  Therapeutic Exercise Activity 2: Median nerve glides: 1 x 10  Therapeutic Exercise Activity 3: Ulnar nerve glides: 1 x 10  Therapeutic Exercise Activity 4: Wrist flex/ext stretch: 1 x 10 each  Therapeutic Exercise Activity 5: Theraball squeezers: 30 reps  Therapeutic Exercise Activity 6: Red putty exercises including: Squeezer and tripod pinch 2 x 2 minutes each  Therapeutic Exercise Activity 7: Red digiweb MP flex and ext    Other Activity  Other Activity 1: Coban wrap applied to R wrist to increase stability while driving and reduce nerve compression. Pt educated on monitoring for discomfort and poor circulation and to remove if necessary.    Assessment & Plan   Assessment: Pt would continue to benefit from skilled OT.      Vikki is progressing well towards her goals and there are no updates to goals at this time. Pt prognosis is Good.     Pt will continue to benefit from skilled outpatient occupational therapy to " address the deficits listed in the problem list on initial evaluation provide pt/family education and to maximize pt's level of independence in the home and community environment.     Pt's spiritual, cultural and educational needs considered and pt agreeable to plan of care and goals.  Evaluation/Treatment Tolerance: Patient tolerated treatment well    Patient will continue to benefit from skilled outpatient occupational therapy to address the deficits listed in the problem list box on initial evaluation, provide pt/family education and to maximize pt's level of independence in the home and community environment.     Patient's spiritual, cultural, and educational needs considered and patient agreeable to plan of care and goals.           Plan: From an occupational therapy perspective, the patient would benefit from: Skilled Rehab Services     Planned therapy interventions include: Therapeutic exercise, Therapeutic activities, Neuromuscular re-education, Orthotic management and training, and Work conditioning.    Planned modalities to include: Cryotherapy (cold pack), Thermotherapy (hot pack), Ultrasound, Paraffin bath, and Fluidotherapy.         Visit Frequency: 2 times Per Week for 8 Weeks.        This plan was discussed with Patient.   Discussion participants: Agreed Upon Plan of Care    Goals:   Active       Occupational Therapy       Occupational Therapy Goal (Progressing)       Start:  03/07/25    Expected End:  05/07/25       Goals:   The following goals were discussed with the patient and patient is in agreement with them as to be addressed in the treatment plan.   Long Term Goals (LTGs); to be met by discharge.  LTG #1: Pt will report a pain level of 2-3 out of 10 at best with functional UE daily tasks   LTG #2: Pt will demo improved FOTO score by 20 points.   LTG #3: Pt will return to prior level of function for use of (B)UE with work related activities.   LTG #4: Pt will demonstrate AROM WFL in order  increase functional use of UE  LTG #5: Pt will display good scapulothoracic glide all scapular planes to improve functional reach and use of UE  LTG #6: Pt will demonstrate  strength of 20#s in order to increase functional use of UE.    Short Term Goals (STGs); to be met within 4 weeks.  STG #1: Pt will report 6 out of 10 pain level with functional use of (B)UE.  STG #2: Pt will demonstrate increased ROM 15 degrees (B) wrist with no report of pain to increase functional use of UE  STG #3: Pt will demonstrate independence with issued HEP.   STG #4: Pt will demonstrate increased  strength of 10#s  to increase functional use of UE.  STG #5: Pt will display good (B)UE elbow/wrist  stability  to improve functional reach and use of UE  STG #6:  Pt will report increased ability to complete daily tasks with use of UE to improve functional independence             Mio Nolasco OT

## 2025-03-13 ENCOUNTER — CLINICAL SUPPORT (OUTPATIENT)
Dept: REHABILITATION | Facility: HOSPITAL | Age: 65
End: 2025-03-13
Payer: MEDICARE

## 2025-03-13 DIAGNOSIS — R20.2 NUMBNESS AND TINGLING OF UPPER EXTREMITY: Primary | ICD-10-CM

## 2025-03-13 DIAGNOSIS — M54.2 ACUTE NECK PAIN: ICD-10-CM

## 2025-03-13 DIAGNOSIS — R20.0 NUMBNESS AND TINGLING OF UPPER EXTREMITY: Primary | ICD-10-CM

## 2025-03-13 DIAGNOSIS — M62.81 HAND MUSCLE WEAKNESS: ICD-10-CM

## 2025-03-13 DIAGNOSIS — M54.12 RADICULOPATHY, CERVICAL REGION: Primary | ICD-10-CM

## 2025-03-13 PROCEDURE — 97018 PARAFFIN BATH THERAPY: CPT | Mod: PN

## 2025-03-13 PROCEDURE — 97110 THERAPEUTIC EXERCISES: CPT | Mod: PN

## 2025-03-13 PROCEDURE — 97032 APPL MODALITY 1+ESTIM EA 15: CPT | Mod: PN

## 2025-03-13 NOTE — LETTER
March 13, 2025  Raffaele Collins MD  [address] [facility], [address] [place] [address]    To whom it may concern,     The attached plan of care is being sent to you for review and reference.    You may indicate your approval by signing the document electronically, or by faxing/mailing a signed copy of the final page of this document back to the attention of Mio Nolasco, OT:         Plan of Care 3/7/25   Effective from: 3/7/2025  Effective to: 5/7/2025    Plan ID: [id number]            Participants as of Finalize on 3/13/2025    Name Type Comments Contact Info    Raffaele Collins MD Referring Provider thank you for this referral [phone]    Melisa Slade OT Occupational Therapist         Last Plan Note     Author: Mio Morton OT Status: Incomplete Last edited: 3/13/2025  8:00 AM         Outpatient Rehab    Occupational Therapy Visit    Patient Name: Vikki Hubbard  MRN: [id number]  YOB: 1960  Encounter Date: 3/13/2025    Therapy Diagnosis:   Encounter Diagnoses   Name Primary?    Numbness and tingling of upper extremity Yes    Hand muscle weakness      Physician: Raffaele Collins, *    Physician Orders: Eval and Treat  Medical Diagnosis: bilateral carpal tunnel syndrome     Visit # / Visits Authorized:  eval   Date of Evaluation:  3/7/2025   Insurance Authorization Period: 3/6/2025 - 12/31/2025  Plan of Care Certification:  3/7/2025 to 5/7/2025                   Time In:     Time Out:    Total Time:     Total Billable Time: ***    FOTO:  Intake Score:  %  Survey Score 1:  %  Survey Score 2:  %         Subjective            Objective          Treatment:  Therapeutic Exercise  Therapeutic Exercise Activity 1: Theraball squeezers: 20 reps with 5 second hold  Therapeutic Exercise Activity 2: Resisted wrist flex and ext: 20 reps 3#  Therapeutic Exercise Activity 3: Pronation/Supination: 20 reps 1#  Therapeutic Exercise Activity 4: Ulnar/radial deviation: 20  reps  Therapeutic Exercise Activity 5: Green digiweb MP flex and ext: 20 reps  Therapeutic Exercise Activity 8: Green weighted clip tripod and lateral: 30 reps each              Modalities  Paraffin Bath: Applied to bilateral hands due to joint stiffness and pain for 8 minutes with additional heat pack application.        Assessment & Plan  Assessment: Pt would continue to benefit from skilled OT. She reports into therapy today with reduced pain and stating improved strength to hands. Vikki is progressing well towards her goals and there are no updates to goals at this time. Pt prognosis is Good. Pt will continue to benefit from skilled outpatient occupational therapy to address the deficits listed in the problem list on initial evaluation provide pt/family education and to maximize pt's level of independence in the home and community environment.     Pt's spiritual, cultural and educational needs considered and pt agreeable to plan of care and goals.  Evaluation/Treatment Tolerance: Patient tolerated treatment well    Patient will continue to benefit from skilled outpatient occupational therapy to address the deficits listed in the problem list box on initial evaluation, provide pt/family education and to maximize pt's level of independence in the home and community environment.     Patient's spiritual, cultural, and educational needs considered and patient agreeable to plan of care and goals.           Plan: From an occupational therapy perspective, the patient would benefit from: Skilled Rehab Services     Planned therapy interventions include: Therapeutic exercise, Therapeutic activities, Neuromuscular re-education, Orthotic management and training, and Work conditioning.    Planned modalities to include: Cryotherapy (cold pack), Thermotherapy (hot pack), Ultrasound, Paraffin bath, and Fluidotherapy.         Visit Frequency: 2 times Per Week for 8 Weeks.        This plan was discussed with Patient.   Discussion  participants: Agreed Upon Plan of Care    Goals:   Active       Occupational Therapy       Occupational Therapy Goal (Progressing)       Start:  03/07/25    Expected End:  05/07/25       Goals:   The following goals were discussed with the patient and patient is in agreement with them as to be addressed in the treatment plan.   Long Term Goals (LTGs); to be met by discharge.  LTG #1: Pt will report a pain level of 2-3 out of 10 at best with functional UE daily tasks   LTG #2: Pt will demo improved FOTO score by 20 points.   LTG #3: Pt will return to prior level of function for use of (B)UE with work related activities.   LTG #4: Pt will demonstrate AROM WFL in order increase functional use of UE  LTG #5: Pt will display good scapulothoracic glide all scapular planes to improve functional reach and use of UE  LTG #6: Pt will demonstrate  strength of 20#s in order to increase functional use of UE.    Short Term Goals (STGs); to be met within 4 weeks.  STG #1: Pt will report 6 out of 10 pain level with functional use of (B)UE.  STG #2: Pt will demonstrate increased ROM 15 degrees (B) wrist with no report of pain to increase functional use of UE  STG #3: Pt will demonstrate independence with issued HEP.   STG #4: Pt will demonstrate increased  strength of 10#s  to increase functional use of UE.  STG #5: Pt will display good (B)UE elbow/wrist  stability  to improve functional reach and use of UE  STG #6:  Pt will report increased ability to complete daily tasks with use of UE to improve functional independence             Mio Nolasco OT           Current Participants as of 3/13/2025    Name Type Comments Contact Info    Raffaele Collins MD Referring Provider thank you for this referral [phone]    Signature pending    Melisa Slade OT Occupational Therapist      Signature pending            Sincerely,      Mio Nolasco OT  [facility]                                                            Dear Mio Nolasco, OT,    RE: Ms. Vikki Hubbard, MRN: [id number]    I certify that I have reviewed the attached plan of care and agree to the details within.        ___________________________  ___________________________  Provider Printed Name   Provider Signed Name      ___________________________  Date and Time

## 2025-03-13 NOTE — LETTER
"              March 14, 2025  Raffaele Collins MD  [address] [facility], [address] [place] [address]    To whom it may concern,     The attached plan of care is being sent to you for review and reference.    You may indicate your approval by signing the document electronically, or by faxing/mailing a signed copy of the final page of this document back to the attention of Mio Nolasco, OT:         Plan of Care 3/7/25   Effective from: 3/7/2025  Effective to: 5/7/2025    Plan ID: [id number]            Participants as of Finalize on 3/14/2025    Name Type Comments Contact Info    Raffaele Collins MD Referring Provider thank you for this referral [phone]    Melisa Slade OT Occupational Therapist         Last Plan Note     Author: Mio Morton OT Status: Addendum Last edited: 3/13/2025  8:00 AM         Outpatient Rehab    Occupational Therapy Visit    Patient Name: Vikki Hubbard  MRN: [id number]  YOB: 1960  Encounter Date: 3/13/2025    Therapy Diagnosis:   Encounter Diagnoses   Name Primary?    Numbness and tingling of upper extremity Yes    Hand muscle weakness      Physician: Raffaele Collins, *    Physician Orders: Eval and Treat       Time In: 0800   Time Out: 0845  Total Time: 45   Total Billable Time: 45    FOTO:  Intake Score:  %  Survey Score 1:  %  Survey Score 2:  %         Subjective   "It's getting better. I have hope now.".  Pain reported as 1/10. "My fingers just feel kind of stiff."    Objective           Treatment:  Therapeutic Exercise  Therapeutic Exercise Activity 1: Theraball squeezers: 20 reps with 5 second hold  Therapeutic Exercise Activity 2: Resisted wrist flex and ext: 20 reps 3#  Therapeutic Exercise Activity 3: Pronation/Supination: 20 reps 1#  Therapeutic Exercise Activity 4: Ulnar/radial deviation: 20 reps  Therapeutic Exercise Activity 5: Green digiweb MP flex and ext: 20 reps  Therapeutic Exercise Activity 7: Red resistance bar: " flex/ext/supination/pronation/ulnar deviation/radial deviation 20 reps each  Therapeutic Exercise Activity 8: Green weighted clip tripod and lateral: 30 reps each                   Modalities  Paraffin Bath: Applied to bilateral hands due to joint stiffness and pain for 8 minutes with additional heat pack application.        Assessment & Plan   Assessment: Pt would continue to benefit from skilled OT. She reports into therapy today with reduced pain and stating improved strength to hands. Vikki is progressing well towards her goals and there are no updates to goals at this time. Pt prognosis is Good. Pt will continue to benefit from skilled outpatient occupational therapy to address the deficits listed in the problem list on initial evaluation provide pt/family education and to maximize pt's level of independence in the home and community environment.     Pt's spiritual, cultural and educational needs considered and pt agreeable to plan of care and goals.  Evaluation/Treatment Tolerance: Patient tolerated treatment well    Patient will continue to benefit from skilled outpatient occupational therapy to address the deficits listed in the problem list box on initial evaluation, provide pt/family education and to maximize pt's level of independence in the home and community environment.     Patient's spiritual, cultural, and educational needs considered and patient agreeable to plan of care and goals.           Plan: From an occupational therapy perspective, the patient would benefit from: Skilled Rehab Services     Planned therapy interventions include: Therapeutic exercise, Therapeutic activities, Neuromuscular re-education, Orthotic management and training, and Work conditioning.    Planned modalities to include: Cryotherapy (cold pack), Thermotherapy (hot pack), Ultrasound, Paraffin bath, and Fluidotherapy.         Visit Frequency: 2 times Per Week for 8 Weeks.        This plan was discussed with Patient.    Discussion participants: Agreed Upon Plan of Care    Goals:   Active       Occupational Therapy       Occupational Therapy Goal (Progressing)       Start:  03/07/25    Expected End:  05/07/25       Goals:   The following goals were discussed with the patient and patient is in agreement with them as to be addressed in the treatment plan.   Long Term Goals (LTGs); to be met by discharge.  LTG #1: Pt will report a pain level of 2-3 out of 10 at best with functional UE daily tasks   LTG #2: Pt will demo improved FOTO score by 20 points.   LTG #3: Pt will return to prior level of function for use of (B)UE with work related activities.   LTG #4: Pt will demonstrate AROM WFL in order increase functional use of UE  LTG #5: Pt will display good scapulothoracic glide all scapular planes to improve functional reach and use of UE  LTG #6: Pt will demonstrate  strength of 20#s in order to increase functional use of UE.    Short Term Goals (STGs); to be met within 4 weeks.  STG #1: Pt will report 6 out of 10 pain level with functional use of (B)UE.  STG #2: Pt will demonstrate increased ROM 15 degrees (B) wrist with no report of pain to increase functional use of UE  STG #3: Pt will demonstrate independence with issued HEP.   STG #4: Pt will demonstrate increased  strength of 10#s  to increase functional use of UE.  STG #5: Pt will display good (B)UE elbow/wrist  stability  to improve functional reach and use of UE  STG #6:  Pt will report increased ability to complete daily tasks with use of UE to improve functional independence             Mio Nolasco OT         Current Participants as of 3/14/2025    Name Type Comments Contact Info    Raffaele Collins MD Referring Provider thank you for this referral [phone]    Signature pending    Melisa Slade OT Occupational Therapist      Signature pending            Sincerely,      Mio Nolasco OT  [facility]                                                            Dear Mio Nolasco, OT,    RE: Ms. Vikki Hubbard, MRN: [id number]    I certify that I have reviewed the attached plan of care and agree to the details within.        ___________________________  ___________________________  Provider Printed Name   Provider Signed Name      ___________________________  Date and Time

## 2025-03-13 NOTE — PROGRESS NOTES
Outpatient Rehab    Physical Therapy Visit    Patient Name: Vikki Hubbard  MRN: [id number]  YOB: 1960  Encounter Date: 3/13/2025    Therapy Diagnosis: No diagnosis found.  Physician: Raffaele Collins, *    Physician Orders: Eval and Treat  Medical Diagnosis: M54.12 (ICD-10-CM) - Radiculopathy, cervical region     Visit # / Visits Authorized:   /    Date of Evaluation:    Insurance Authorization Period: 2/18/25 to 12/31/25  Plan of Care Certification:   to       Time In: 1300   Time Out: 1340  Total Time: 40   Total Billable Time: 36    FOTO:  Intake Score:  %  Survey Score 1:  %  Survey Score 2:  %         Subjective   Patient continues to report tightness in R upper trap region..  Pain reported as 1/10.      Objective            Treatment:   Modalities  Moist Heat (min): 10 minutes to the neck, supine, clip to secure  Electrical Stimulation (Parameters): 1- 40 L UT; 2-40 mm to C5 and C7 with e-stim    Other Activity  Other Activity 2: 1 x 10 chin tucks  Other Activity 3: 2 x 10 YTB Bruegger's  Other Activity 4: 1 x 10 seated SB flexion with hold 5 seconds         Modalities  Moist Heat (min): 10 minutes to the neck, supine, clip to secure  Electrical Stimulation (Parameters): 1- 40 L UT; 2-40 mm to C5 and C7 with e-stim  MT x 5' applied to cervical area in supine position prior to MT.     Assessment & Plan   Assessment: Patient with positive response to FDN today. Patient would benefit from continued posture awareness and scapular stability.       Patient will continue to benefit from skilled outpatient physical therapy to address the deficits listed in the problem list box on initial evaluation, provide pt/family education and to maximize pt's level of independence in the home and community environment.     Patient's spiritual, cultural, and educational needs considered and patient agreeable to plan of care and goals.           Plan: Continue with plan of care as tolerated    Goals:   Active        Functional outcome       Patient stated goal:          Start:  02/19/25               Pain       Patient will report pain of 1/10 demonstrating a reduction of overall pain       Start:  02/19/25    Expected End:  03/05/25            Patient will report a 2 point reduction in pain while performing lifting, carrying activity.       Start:  02/19/25    Expected End:  04/02/25               Range of Motion       Patient will achieve bilateral cervical side bending ROM WNL       Start:  02/19/25    Expected End:  04/02/25            Patient will achieve bilateral cervical rotation ROM WNL       Start:  02/19/25    Expected End:  04/02/25            Patient will achieve cervical flexion ROM WNL       Start:  02/19/25    Expected End:  04/02/25            Patient will achieve cervical extension ROM WNL       Start:  02/19/25    Expected End:  04/02/25            Patient will achieve full cervical retraction       Start:  02/19/25    Expected End:  04/02/25            Patient will achieve full cervical protraction       Start:  02/19/25    Expected End:  04/02/25                Laly Boyle PT

## 2025-03-13 NOTE — PROGRESS NOTES
"  Outpatient Rehab    Occupational Therapy Visit    Patient Name: Vikki Hubbard  MRN: [id number]  YOB: 1960  Encounter Date: 3/13/2025    Therapy Diagnosis:   Encounter Diagnoses   Name Primary?    Numbness and tingling of upper extremity Yes    Hand muscle weakness      Physician: Raffaele Collins, *    Physician Orders: Eval and Treat       Time In: 0800   Time Out: 0845  Total Time: 45   Total Billable Time: 45    FOTO:  Intake Score:  %  Survey Score 1:  %  Survey Score 2:  %         Subjective   "It's getting better. I have hope now.".  Pain reported as 1/10. "My fingers just feel kind of stiff."    Objective           Treatment:  Therapeutic Exercise  Therapeutic Exercise Activity 1: Theraball squeezers: 20 reps with 5 second hold  Therapeutic Exercise Activity 2: Resisted wrist flex and ext: 20 reps 3#  Therapeutic Exercise Activity 3: Pronation/Supination: 20 reps 1#  Therapeutic Exercise Activity 4: Ulnar/radial deviation: 20 reps  Therapeutic Exercise Activity 5: Green digiweb MP flex and ext: 20 reps  Therapeutic Exercise Activity 7: Red resistance bar: flex/ext/supination/pronation/ulnar deviation/radial deviation 20 reps each  Therapeutic Exercise Activity 8: Green weighted clip tripod and lateral: 30 reps each                   Modalities  Paraffin Bath: Applied to bilateral hands due to joint stiffness and pain for 8 minutes with additional heat pack application.        Assessment & Plan   Assessment: Pt would continue to benefit from skilled OT. She reports into therapy today with reduced pain and stating improved strength to hands. Vikki is progressing well towards her goals and there are no updates to goals at this time. Pt prognosis is Good. Pt will continue to benefit from skilled outpatient occupational therapy to address the deficits listed in the problem list on initial evaluation provide pt/family education and to maximize pt's level of independence in the home and community " environment.     Pt's spiritual, cultural and educational needs considered and pt agreeable to plan of care and goals.  Evaluation/Treatment Tolerance: Patient tolerated treatment well    Patient will continue to benefit from skilled outpatient occupational therapy to address the deficits listed in the problem list box on initial evaluation, provide pt/family education and to maximize pt's level of independence in the home and community environment.     Patient's spiritual, cultural, and educational needs considered and patient agreeable to plan of care and goals.           Plan: From an occupational therapy perspective, the patient would benefit from: Skilled Rehab Services     Planned therapy interventions include: Therapeutic exercise, Therapeutic activities, Neuromuscular re-education, Orthotic management and training, and Work conditioning.    Planned modalities to include: Cryotherapy (cold pack), Thermotherapy (hot pack), Ultrasound, Paraffin bath, and Fluidotherapy.         Visit Frequency: 2 times Per Week for 8 Weeks.        This plan was discussed with Patient.   Discussion participants: Agreed Upon Plan of Care    Goals:   Active       Occupational Therapy       Occupational Therapy Goal (Progressing)       Start:  03/07/25    Expected End:  05/07/25       Goals:   The following goals were discussed with the patient and patient is in agreement with them as to be addressed in the treatment plan.   Long Term Goals (LTGs); to be met by discharge.  LTG #1: Pt will report a pain level of 2-3 out of 10 at best with functional UE daily tasks   LTG #2: Pt will demo improved FOTO score by 20 points.   LTG #3: Pt will return to prior level of function for use of (B)UE with work related activities.   LTG #4: Pt will demonstrate AROM WFL in order increase functional use of UE  LTG #5: Pt will display good scapulothoracic glide all scapular planes to improve functional reach and use of UE  LTG #6: Pt will demonstrate   strength of 20#s in order to increase functional use of UE.    Short Term Goals (STGs); to be met within 4 weeks.  STG #1: Pt will report 6 out of 10 pain level with functional use of (B)UE.  STG #2: Pt will demonstrate increased ROM 15 degrees (B) wrist with no report of pain to increase functional use of UE  STG #3: Pt will demonstrate independence with issued HEP.   STG #4: Pt will demonstrate increased  strength of 10#s  to increase functional use of UE.  STG #5: Pt will display good (B)UE elbow/wrist  stability  to improve functional reach and use of UE  STG #6:  Pt will report increased ability to complete daily tasks with use of UE to improve functional independence             Mio Nolasco OT

## 2025-03-18 ENCOUNTER — CLINICAL SUPPORT (OUTPATIENT)
Dept: REHABILITATION | Facility: HOSPITAL | Age: 65
End: 2025-03-18
Payer: MEDICARE

## 2025-03-18 DIAGNOSIS — M54.12 RADICULOPATHY, CERVICAL REGION: Primary | ICD-10-CM

## 2025-03-18 DIAGNOSIS — R20.2 NUMBNESS AND TINGLING OF UPPER EXTREMITY: Primary | ICD-10-CM

## 2025-03-18 DIAGNOSIS — R20.0 NUMBNESS AND TINGLING OF UPPER EXTREMITY: Primary | ICD-10-CM

## 2025-03-18 DIAGNOSIS — M62.81 HAND MUSCLE WEAKNESS: ICD-10-CM

## 2025-03-18 PROCEDURE — 97110 THERAPEUTIC EXERCISES: CPT | Mod: PN,CQ

## 2025-03-18 PROCEDURE — 97140 MANUAL THERAPY 1/> REGIONS: CPT | Mod: PN,CQ

## 2025-03-18 PROCEDURE — 97110 THERAPEUTIC EXERCISES: CPT | Mod: PN

## 2025-03-18 PROCEDURE — 97018 PARAFFIN BATH THERAPY: CPT | Mod: PN

## 2025-03-18 NOTE — Clinical Note
March 19, 2025  Raffaele Collins MD  [address] [facility], [address]  [place] [address]    To whom it may concern,     The attached plan of care is being sent to you for review and reference.    You may indicate your approval by signing the document electronically, or by faxing/mailing a signed copy of the final page of this document back to the attention of Mio Nolasco, OT:         Plan of Care 3/7/25   Effective from: 3/7/2025  Effective to: 5/7/2025    Plan ID: [id number]            Participants as of Finalize on 3/19/2025    Name Type Comments Contact Info    Raffaele Collins MD Referring Provider thank you for this referral [phone]    Melisa Slade OT Occupational Therapist         Last Plan Note     Author: Mio Morton OT Status: Addendum Last edited: 3/18/2025 10:30 AM           Outpatient Rehab    Occupational Therapy Visit    Patient Name: Vikki Hubbard  MRN: [id number]  YOB: 1960  Encounter Date: 3/18/2025    Therapy Diagnosis:   Encounter Diagnoses   Name Primary?    Numbness and tingling of upper extremity Yes    Hand muscle weakness        Physician: Raffaele Collins, *    Physician Orders: Eval and Treat       Time In: 1023   Time Out: 1105  Total Time: 42   Total Billable Time: 45    FOTO:  Intake Score:  %  Survey Score 1:  %  Survey Score 2:  %         Subjective   No new complaints.  Pain reported as 0/10. My fingers are still just kind of stiff.    Objective           Treatment:  Therapeutic Exercise  TE 1: Green resistance bar exercises including: wrist ext/flex, supination/pronation, ulnar/radial deviation: 20 reps each  TE 2: Green digiweb: MP flex/ext, tripod and lateral pinches 20 each  TE 3: Prayer stretch: 1 x 10 with 5 seconhd hold  TE 4: Theraball squeezers: 20 reps with 5 second hold                   Modalities  Cryotherapy (Minutes\Location): 5 minutes to bilateral hands to reduce post exercise swelling  Paraffin Bath: Applied to  bilateral hands due to joint stiffness and pain for 8 minutes with additional heat pack application.        Assessment & Plan   Assessment: Pt would continue to benefit from skilled OT. Vikki is progressing well towards her goals and there are no updates to goals at this time. Pt prognosis is Good. Pt will continue to benefit from skilled outpatient occupational therapy to address the deficits listed in the problem list on initial evaluation provide pt/family education and to maximize pt's level of independence in the home and community environment.     Pt's spiritual, cultural and educational needs considered and pt agreeable to plan of care and goals.  Evaluation/Treatment Tolerance: Patient tolerated treatment well    Patient will continue to benefit from skilled outpatient occupational therapy to address the deficits listed in the problem list box on initial evaluation, provide pt/family education and to maximize pt's level of independence in the home and community environment.     Patient's spiritual, cultural, and educational needs considered and patient agreeable to plan of care and goals.           Plan: From an occupational therapy perspective, the patient would benefit from: Skilled Rehab Services     Planned therapy interventions include: Therapeutic exercise, Therapeutic activities, Neuromuscular re-education, Orthotic management and training, and Work conditioning.    Planned modalities to include: Cryotherapy (cold pack), Thermotherapy (hot pack), Ultrasound, Paraffin bath, and Fluidotherapy.         Visit Frequency: 2 times Per Week for 8 Weeks.        This plan was discussed with Patient.   Discussion participants: Agreed Upon Plan of Care    Goals:   Active       Occupational Therapy       Occupational Therapy Goal (Progressing)       Start:  03/07/25    Expected End:  05/07/25       Goals:   The following goals were discussed with the patient and patient is in agreement with them as to be addressed  in the treatment plan.   Long Term Goals (LTGs); to be met by discharge.  LTG #1: Pt will report a pain level of 2-3 out of 10 at best with functional UE daily tasks   LTG #2: Pt will demo improved FOTO score by 20 points.   LTG #3: Pt will return to prior level of function for use of (B)UE with work related activities.   LTG #4: Pt will demonstrate AROM WFL in order increase functional use of UE  LTG #5: Pt will display good scapulothoracic glide all scapular planes to improve functional reach and use of UE  LTG #6: Pt will demonstrate  strength of 20#s in order to increase functional use of UE.    Short Term Goals (STGs); to be met within 4 weeks.  STG #1: Pt will report 6 out of 10 pain level with functional use of (B)UE.  STG #2: Pt will demonstrate increased ROM 15 degrees (B) wrist with no report of pain to increase functional use of UE  STG #3: Pt will demonstrate independence with issued HEP.   STG #4: Pt will demonstrate increased  strength of 10#s  to increase functional use of UE.  STG #5: Pt will display good (B)UE elbow/wrist  stability  to improve functional reach and use of UE  STG #6:  Pt will report increased ability to complete daily tasks with use of UE to improve functional independence             Mio Nolasco OT         Current Participants as of 3/19/2025    Name Type Comments Contact Info    Raffaele Collins MD Referring Provider thank you for this referral [phone]    Signature pending    Melisa Slade OT Occupational Therapist      Signature pending            Sincerely,      Mio Nolasco OT  [facility]                                                            Dear Mio Nolasco OT,    RE: Ms. Vikki Hubbard, MRN: [id number]    I certify that I have reviewed the attached plan of care and agree to the details within.        ___________________________  ___________________________  Provider Printed Name   Provider Signed  Name      ___________________________  Date and Time

## 2025-03-18 NOTE — PROGRESS NOTES
Outpatient Rehab    Occupational Therapy Visit    Patient Name: Vikki Hubbard  MRN: [id number]  YOB: 1960  Encounter Date: 3/18/2025    Therapy Diagnosis:   Encounter Diagnoses   Name Primary?    Numbness and tingling of upper extremity Yes    Hand muscle weakness        Physician: Raffaele Collins, *    Physician Orders: Eval and Treat       Time In: 1023   Time Out: 1105  Total Time: 42   Total Billable Time: 45    FOTO:  Intake Score:  %  Survey Score 1:  %  Survey Score 2:  %         Subjective   No new complaints.  Pain reported as 0/10. My fingers are still just kind of stiff.    Objective           Treatment:  Therapeutic Exercise  TE 1: Green resistance bar exercises including: wrist ext/flex, supination/pronation, ulnar/radial deviation: 20 reps each  TE 2: Green digiweb: MP flex/ext, tripod and lateral pinches 20 each  TE 3: Prayer stretch: 1 x 10 with 5 seconhd hold  TE 4: Theraball squeezers: 20 reps with 5 second hold                   Modalities  Cryotherapy (Minutes\Location): 5 minutes to bilateral hands to reduce post exercise swelling  Paraffin Bath: Applied to bilateral hands due to joint stiffness and pain for 8 minutes with additional heat pack application.        Assessment & Plan   Assessment: Pt would continue to benefit from skilled OT. Vikki is progressing well towards her goals and there are no updates to goals at this time. Pt prognosis is Good. Pt will continue to benefit from skilled outpatient occupational therapy to address the deficits listed in the problem list on initial evaluation provide pt/family education and to maximize pt's level of independence in the home and community environment.     Pt's spiritual, cultural and educational needs considered and pt agreeable to plan of care and goals.  Evaluation/Treatment Tolerance: Patient tolerated treatment well    Patient will continue to benefit from skilled outpatient occupational therapy to address the deficits  listed in the problem list box on initial evaluation, provide pt/family education and to maximize pt's level of independence in the home and community environment.     Patient's spiritual, cultural, and educational needs considered and patient agreeable to plan of care and goals.           Plan: From an occupational therapy perspective, the patient would benefit from: Skilled Rehab Services     Planned therapy interventions include: Therapeutic exercise, Therapeutic activities, Neuromuscular re-education, Orthotic management and training, and Work conditioning.    Planned modalities to include: Cryotherapy (cold pack), Thermotherapy (hot pack), Ultrasound, Paraffin bath, and Fluidotherapy.         Visit Frequency: 2 times Per Week for 8 Weeks.        This plan was discussed with Patient.   Discussion participants: Agreed Upon Plan of Care    Goals:   Active       Occupational Therapy       Occupational Therapy Goal (Progressing)       Start:  03/07/25    Expected End:  05/07/25       Goals:   The following goals were discussed with the patient and patient is in agreement with them as to be addressed in the treatment plan.   Long Term Goals (LTGs); to be met by discharge.  LTG #1: Pt will report a pain level of 2-3 out of 10 at best with functional UE daily tasks   LTG #2: Pt will demo improved FOTO score by 20 points.   LTG #3: Pt will return to prior level of function for use of (B)UE with work related activities.   LTG #4: Pt will demonstrate AROM WFL in order increase functional use of UE  LTG #5: Pt will display good scapulothoracic glide all scapular planes to improve functional reach and use of UE  LTG #6: Pt will demonstrate  strength of 20#s in order to increase functional use of UE.    Short Term Goals (STGs); to be met within 4 weeks.  STG #1: Pt will report 6 out of 10 pain level with functional use of (B)UE.  STG #2: Pt will demonstrate increased ROM 15 degrees (B) wrist with no report of pain to  increase functional use of UE  STG #3: Pt will demonstrate independence with issued HEP.   STG #4: Pt will demonstrate increased  strength of 10#s  to increase functional use of UE.  STG #5: Pt will display good (B)UE elbow/wrist  stability  to improve functional reach and use of UE  STG #6:  Pt will report increased ability to complete daily tasks with use of UE to improve functional independence             Mio Nolasco OT

## 2025-03-18 NOTE — PROGRESS NOTES
Outpatient Rehab    Physical Therapy Visit    Patient Name: Vikki Hubbard  MRN: [id number]  YOB: 1960  Encounter Date: 3/18/2025    Therapy Diagnosis:   Encounter Diagnosis   Name Primary?    Radiculopathy, cervical region Yes     Physician: Raffaele Collins, *    Physician Orders: Eval and Treat  Medical Diagnosis: Radiculopathy, cervical region    Visit # / Visits Authorized:  6 / 20  Date of Evaluation: 2/19/2025  Insurance Authorization Period: 2/18/2025 to 12/31/2025  Plan of Care Certification:  2/19/2025 to 4/02/2025      PT/PTA:   PTA  Number of PTA visits since last PT visit: 1  Time In: 0945   Time Out: 1023  Total Time: 38   Total Billable Time: 33    FOTO:  Intake Score:  %  Survey Score 1:  %  Survey Score 2:  %         Subjective   Pt reports tightness in L shoulder blade..         Objective            Treatment:  Therapeutic Exercise  TE 1: 1 x 10 chin tucks  TE 2: 2 x 10 YTB Bruegger's  TE 3: 1 x 10 seated SB flexion with hold 5 seconds  TE 4: L lateral stretch w/ towel pull down 10x's each side  Manual Therapy  MT 1: Performed STM and scapular mobilization on prone position to L shoulder girdle/UT.  Modalities  Moist Heat (min): 5 minutes to the L side posterior shoulder in supine position    Time Entry(in minutes):  Hot/Cold Pack Time Entry: 5  Manual Therapy Time Entry: 15  Therapeutic Exercise Time Entry: 18    Assessment & Plan   Assessment: Positive response to MT with improved L scapula mobility noted and verbalized by pt afterward.       Patient will continue to benefit from skilled outpatient physical therapy to address the deficits listed in the problem list box on initial evaluation, provide pt/family education and to maximize pt's level of independence in the home and community environment.     Patient's spiritual, cultural, and educational needs considered and patient agreeable to plan of care and goals.           Plan:  Cont POC    Goals:   Active       Functional  outcome       Patient stated goal:          Start:  02/19/25               Pain       Patient will report pain of 1/10 demonstrating a reduction of overall pain       Start:  02/19/25    Expected End:  03/05/25            Patient will report a 2 point reduction in pain while performing lifting, carrying activity.       Start:  02/19/25    Expected End:  04/02/25               Range of Motion       Patient will achieve bilateral cervical side bending ROM WNL       Start:  02/19/25    Expected End:  04/02/25            Patient will achieve bilateral cervical rotation ROM WNL       Start:  02/19/25    Expected End:  04/02/25            Patient will achieve cervical flexion ROM WNL       Start:  02/19/25    Expected End:  04/02/25            Patient will achieve cervical extension ROM WNL       Start:  02/19/25    Expected End:  04/02/25            Patient will achieve full cervical retraction       Start:  02/19/25    Expected End:  04/02/25            Patient will achieve full cervical protraction       Start:  02/19/25    Expected End:  04/02/25                Marianne Swan PTA

## 2025-03-20 ENCOUNTER — CLINICAL SUPPORT (OUTPATIENT)
Dept: REHABILITATION | Facility: HOSPITAL | Age: 65
End: 2025-03-20
Payer: MEDICARE

## 2025-03-20 DIAGNOSIS — R20.2 NUMBNESS AND TINGLING OF UPPER EXTREMITY: Primary | ICD-10-CM

## 2025-03-20 DIAGNOSIS — M54.12 RADICULOPATHY, CERVICAL REGION: Primary | ICD-10-CM

## 2025-03-20 DIAGNOSIS — M54.2 ACUTE NECK PAIN: ICD-10-CM

## 2025-03-20 DIAGNOSIS — M62.81 HAND MUSCLE WEAKNESS: ICD-10-CM

## 2025-03-20 DIAGNOSIS — R20.0 NUMBNESS AND TINGLING OF UPPER EXTREMITY: Primary | ICD-10-CM

## 2025-03-20 PROCEDURE — 97110 THERAPEUTIC EXERCISES: CPT | Mod: PN

## 2025-03-20 PROCEDURE — 97018 PARAFFIN BATH THERAPY: CPT | Mod: PN

## 2025-03-20 PROCEDURE — 97032 APPL MODALITY 1+ESTIM EA 15: CPT | Mod: PN

## 2025-03-20 NOTE — PROGRESS NOTES
Outpatient Rehab    Physical Therapy Visit    Patient Name: Vikki Hubbard  MRN: [id number]  YOB: 1960  Encounter Date: 3/20/2025    Therapy Diagnosis:   Encounter Diagnoses   Name Primary?    Radiculopathy, cervical region Yes    Acute neck pain      Physician: Raffaele Collins, *    Physician Orders: Eval and Treat  Medical Diagnosis: M54.12 (ICD-10-CM) - Radiculopathy, cervical region     Visit # / Visits Authorized:   7/  10  Date of Evaluation:  2/19/2025  Insurance Authorization Period: 2/18/25 to 12/31/25  Plan of Care Certification:  2/18/2025 to  4/17/2025     Time In: 1345   Time Out: 1425  Total Time: 40   Total Billable Time: 40    FOTO:  Intake Score:  %  Survey Score 1:  %  Survey Score 2:  %         Subjective   Patient report increased hand pain..         Objective            Treatment:  Therapeutic Exercise  TE 9: 2 x 10 Bruegger's YTB  TE 10: 1 x 10 seated SB flexionModalities  Moist Heat (min): 5 minutes prior to treatment  Electrical Stimulation (Parameters): 1- 40 L UT; 2-40 mm to C5 and C7 with e-stim              Modalities  Moist Heat (min): 5 minutes prior to treatment  Electrical Stimulation (Parameters): 1- 40 L UT; 2-40 mm to C5 and C7 with e-stim  MT x 5' applied to cervical area in supine position prior to MT.     Assessment & Plan   Assessment: Patient tolerated today's treatment of FDN well. Positive repsonse following treatment.       Patient will continue to benefit from skilled outpatient physical therapy to address the deficits listed in the problem list box on initial evaluation, provide pt/family education and to maximize pt's level of independence in the home and community environment.     Patient's spiritual, cultural, and educational needs considered and patient agreeable to plan of care and goals.           Plan: Continue with plan of care as tolerated    Goals:   Active       Functional outcome       Patient stated goal:          Start:  02/19/25                Pain       Patient will report pain of 1/10 demonstrating a reduction of overall pain       Start:  02/19/25    Expected End:  03/05/25            Patient will report a 2 point reduction in pain while performing lifting, carrying activity.       Start:  02/19/25    Expected End:  04/02/25               Range of Motion       Patient will achieve bilateral cervical side bending ROM WNL       Start:  02/19/25    Expected End:  04/02/25            Patient will achieve bilateral cervical rotation ROM WNL       Start:  02/19/25    Expected End:  04/02/25            Patient will achieve cervical flexion ROM WNL       Start:  02/19/25    Expected End:  04/02/25            Patient will achieve cervical extension ROM WNL       Start:  02/19/25    Expected End:  04/02/25            Patient will achieve full cervical retraction       Start:  02/19/25    Expected End:  04/02/25            Patient will achieve full cervical protraction       Start:  02/19/25    Expected End:  04/02/25                Laly Boyle, PT

## 2025-03-25 ENCOUNTER — CLINICAL SUPPORT (OUTPATIENT)
Dept: REHABILITATION | Facility: HOSPITAL | Age: 65
End: 2025-03-25
Payer: MEDICARE

## 2025-03-25 DIAGNOSIS — M62.81 HAND MUSCLE WEAKNESS: Primary | ICD-10-CM

## 2025-03-25 DIAGNOSIS — M54.12 RADICULOPATHY, CERVICAL REGION: Primary | ICD-10-CM

## 2025-03-25 PROCEDURE — 97010 HOT OR COLD PACKS THERAPY: CPT | Mod: PN

## 2025-03-25 PROCEDURE — 97110 THERAPEUTIC EXERCISES: CPT | Mod: PN

## 2025-03-25 PROCEDURE — 97140 MANUAL THERAPY 1/> REGIONS: CPT | Mod: PN

## 2025-03-25 PROCEDURE — 97018 PARAFFIN BATH THERAPY: CPT | Mod: PN

## 2025-03-25 NOTE — PROGRESS NOTES
Outpatient Rehab    Physical Therapy Visit    Patient Name: Vikki Hubbard  MRN: [id number]  YOB: 1960  Encounter Date: 3/25/2025    Therapy Diagnosis:   Encounter Diagnosis   Name Primary?    Radiculopathy, cervical region Yes     Physician: Raffaele Collins, *    Physician Orders: Eval and Treat  Medical Diagnosis: Radiculopathy, cervical region    Visit # / Visits Authorized:  8 / 20  Insurance Authorization Period: 2/18/2025 to 12/31/2025  Date of Evaluation: 2/19/25  Plan of Care Certification: 2/19/25 to 4/4/25     PT/PTA:     Number of PTA visits since last PT visit:   Time In: 0940   Time Out: 1020  Total Time: 40   Total Billable Time: 40    FOTO:  Intake Score:  %  Survey Score 1:  %  Survey Score 2:  %         Subjective        right hand pain is bothering her more today, creating difficulty with work but not stopping her from working    Objective            Treatment:  Therapeutic Exercise  TE 9: Bruegger's YTB x10, RTB X10  TE 10: seated SB flexion STRETCH, 5 sec hold, 5x each direction  Manual Therapy  MT 2: supine STM cervical muscles, bilateral parascapular muscles  MT 3: manual traction, 10 sec stretch x5 reps  Balance/Neuromuscular Re-Education  NMR 1: Chin tucks 2 x 10 with 2sec hold  NMR 2: Open books in sidelying 2 x 10 ea side  Modalities  Moist Heat (min): 10 minutes to the cervical spine and upper back prior to treatment    Time Entry(in minutes):  Hot/Cold Pack Time Entry: 10  Manual Therapy Time Entry: 15  Therapeutic Exercise Time Entry: 15    Assessment & Plan   Assessment: Patient responding appropriately to manual treatment but only achieving relief in the neck and scapular muscles; right hand/arm pain continues; says dry needling seems most effective  Evaluation/Treatment Tolerance: Patient tolerated treatment well    Patient will continue to benefit from skilled outpatient physical therapy to address the deficits listed in the problem list box on initial evaluation,  provide pt/family education and to maximize pt's level of independence in the home and community environment.     Patient's spiritual, cultural, and educational needs considered and patient agreeable to plan of care and goals.           Plan: Continue with plan of care as tolerated    Goals:   Active       Functional outcome       Patient stated goal:    (Not Progressing)       Start:  02/19/25               Pain       Patient will report pain of 1/10 demonstrating a reduction of overall pain (Not Progressing)       Start:  02/19/25    Expected End:  03/25/25            Patient will report a 2 point reduction in pain while performing lifting, carrying activity. (Not Progressing)       Start:  02/19/25    Expected End:  04/02/25               Range of Motion       Patient will achieve bilateral cervical side bending ROM WNL (Not Progressing)       Start:  02/19/25    Expected End:  04/02/25            Patient will achieve bilateral cervical rotation ROM WNL (Not Progressing)       Start:  02/19/25    Expected End:  04/02/25            Patient will achieve cervical flexion ROM WNL (Not Progressing)       Start:  02/19/25    Expected End:  04/02/25            Patient will achieve cervical extension ROM WNL (Not Progressing)       Start:  02/19/25    Expected End:  04/02/25            Patient will achieve full cervical retraction (Not Progressing)       Start:  02/19/25    Expected End:  04/02/25            Patient will achieve full cervical protraction (Not Progressing)       Start:  02/19/25    Expected End:  04/02/25                Seth Jade PT

## 2025-03-25 NOTE — PROGRESS NOTES
"  Outpatient Rehab    Occupational Therapy Visit    Patient Name: Vikki Hubbard  MRN: [id number]  YOB: 1960  Encounter Date: 3/20/2025    Therapy Diagnosis:   Encounter Diagnoses   Name Primary?    Numbness and tingling of upper extremity Yes    Hand muscle weakness      Physician: Raffaele Collins, *    Physician Orders: Eval and Treat  Medical Diagnosis: Carpal tunnel syndrome, bilateral upper limbs    Visit # / Visits Authorized: 4 / 10  Insurance Authorization Period: 3/6/2025 to 12/31/2025  Date of Evaluation: 3/7/2025  Plan of Care Certification: 3-7-2025 to 5/7/2025     Time In: 1430   Time Out: 1515  Total Time: 45   Total Billable Time: 34    FOTO:  Intake Score:  %  Survey Score 1:  %  Survey Score 2:  %         Subjective   No new complaints.  Pain reported as 2/10. "I just have some soreness."    Objective           Treatment:  Therapeutic Exercise  TE 1: Red band MP extension: 20 reps BUE  TE 2: Resisted wrist ext/flex: 20 reps each 4#  TE 3: Prayer stretch: 1 x 10 with 5 seconhd hold  TE 4: Theraball squeezers: 20 reps with 5 second hold  TE 5: Pronator bar supination/pronation ulnar and radial deviation: 20 reps each 1#  TE 6: Red weighted clip tripod and lateral pinches: 30 reps each  TE 7: Green resistance bar: flex/ext/supination/pronation/ulnar deviation/radial deviation 20 reps each  TE 8: Green weighted clip tripod and lateral: 30 reps each  Therapeutic Activity  TA 1: Grooved Peg Board: x23 pegs BUE  Modalities  Moist Heat (min): 5 minutes prior to treatment  Cryotherapy (Minutes\Location): 5 minutes to bilateral hands to reduce post exercise swelling  Paraffin Bath: Applied to bilateral hands due to joint stiffness and pain for 8 minutes with additional heat pack application.    Time Entry(in minutes):  Hot/Cold Pack Time Entry: 5  Paraffin Bath Time Entry: 8  Therapeutic Activity Time Entry: 6  Therapeutic Exercise Time Entry: 26    Assessment & Plan   Assessment: Pt would " continue to benefit from skilled OT. Vikki is progressing well towards her goals and there are no updates to goals at this time. Pt prognosis is Good. Pt will continue to benefit from skilled outpatient occupational therapy to address the deficits listed in the problem list on initial evaluation provide pt/family education and to maximize pt's level of independence in the home and community environment.     Pt's spiritual, cultural and educational needs considered and pt agreeable to plan of care and goals.  Evaluation/Treatment Tolerance: Patient tolerated treatment well    Patient will continue to benefit from skilled outpatient occupational therapy to address the deficits listed in the problem list box on initial evaluation, provide pt/family education and to maximize pt's level of independence in the home and community environment.     Patient's spiritual, cultural, and educational needs considered and patient agreeable to plan of care and goals.           Plan: From an occupational therapy perspective, the patient would benefit from: Skilled Rehab Services     Planned therapy interventions include: Therapeutic exercise, Therapeutic activities, Neuromuscular re-education, Orthotic management and training, and Work conditioning.    Planned modalities to include: Cryotherapy (cold pack), Thermotherapy (hot pack), Ultrasound, Paraffin bath, and Fluidotherapy.         Visit Frequency: 2 times Per Week for 8 Weeks.        This plan was discussed with Patient.   Discussion participants: Agreed Upon Plan of Care    Goals:   Active       Occupational Therapy       Occupational Therapy Goal (Progressing)       Start:  03/07/25    Expected End:  05/07/25       Goals:   The following goals were discussed with the patient and patient is in agreement with them as to be addressed in the treatment plan.   Long Term Goals (LTGs); to be met by discharge.  LTG #1: Pt will report a pain level of 2-3 out of 10 at best with  functional UE daily tasks   LTG #2: Pt will demo improved FOTO score by 20 points.   LTG #3: Pt will return to prior level of function for use of (B)UE with work related activities.   LTG #4: Pt will demonstrate AROM WFL in order increase functional use of UE  LTG #5: Pt will display good scapulothoracic glide all scapular planes to improve functional reach and use of UE  LTG #6: Pt will demonstrate  strength of 20#s in order to increase functional use of UE.    Short Term Goals (STGs); to be met within 4 weeks.  STG #1: Pt will report 6 out of 10 pain level with functional use of (B)UE.  STG #2: Pt will demonstrate increased ROM 15 degrees (B) wrist with no report of pain to increase functional use of UE  STG #3: Pt will demonstrate independence with issued HEP.   STG #4: Pt will demonstrate increased  strength of 10#s  to increase functional use of UE.  STG #5: Pt will display good (B)UE elbow/wrist  stability  to improve functional reach and use of UE  STG #6:  Pt will report increased ability to complete daily tasks with use of UE to improve functional independence             Mio Nolasco OT

## 2025-03-25 NOTE — PROGRESS NOTES
"  Outpatient Rehab    Occupational Therapy Visit    Patient Name: Vikki Hubbard  MRN: [id number]  YOB: 1960  Encounter Date: 3/25/2025    Therapy Diagnosis:   Encounter Diagnosis   Name Primary?    Hand muscle weakness Yes     Physician: Raffaele Collins, *    Physician Orders: Eval and Treat  Medical Diagnosis: Carpal tunnel syndrome, bilateral upper limbs    Visit # / Visits Authorized: 5 / 10  Insurance Authorization Period: 3/6/2025 to 12/31/2025  Date of Evaluation: 3/7/2025  Plan of Care Certification: 3-7-2025 to 5/7/2025     Time In: 1035   Time Out: 1110  Total Time: 35   Total Billable Time: 35    FOTO:  Intake Score:  %  Survey Score 1:  %  Survey Score 2:  %         Subjective   "I don't know, my hands have been hurting a lot lately.".  Pain reported as 6/10. 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    Objective           Treatment:  Therapeutic Exercise  TE 1: Tendon glides: 10 reps  TE 2: PROM wrist flex stretch: 1 x 10  TE 3: Prayer stretch: 1 x 10 with 5 seconhd hold  TE 4: Theraball squeezers: 20 reps with 5 second hold  TE 5: Wrist ROM no weight: flex/ext 20 reps each  TE 6: WRist ROM supination/pronation: 20 reps  TE 7: Yellow resistance bar: flex/ext/supination/pronation/ulnar deviation/radial deviation 20 reps each  Modalities  Cryotherapy (Minutes\Location): 5 minutes to bilateral hands to reduce post exercise swelling    Time Entry(in minutes):  Paraffin Bath Time Entry: 8  Therapeutic Exercise Time Entry: 24    Assessment & Plan   Assessment: Pt would continue to benefit from skilled OT. She is reporting to session today in increased pain. She states the pain began about 1 week ago following working with a patient at work who required incraesed assistance. Since, she has been unable to wear braces due to increased pain. She did say her pain level was " reduced following session today. SEssion focusing on pain modalities and gentle ROM exercises with Pt coban wrapped at end of session for wrok.  Vikki is progressing well towards her goals and there are no updates to goals at this time. Pt prognosis is Good. Pt will continue to benefit from skilled outpatient occupational therapy to address the deficits listed in the problem list on initial evaluation provide pt/family education and to maximize pt's level of independence in the home and community environment.     Pt's spiritual, cultural and educational needs considered and pt agreeable to plan of care and goals.  Evaluation/Treatment Tolerance: Patient tolerated treatment well    Patient will continue to benefit from skilled outpatient occupational therapy to address the deficits listed in the problem list box on initial evaluation, provide pt/family education and to maximize pt's level of independence in the home and community environment.     Patient's spiritual, cultural, and educational needs considered and patient agreeable to plan of care and goals.           Plan: From an occupational therapy perspective, the patient would benefit from: Skilled Rehab Services     Planned therapy interventions include: Therapeutic exercise, Therapeutic activities, Neuromuscular re-education, Orthotic management and training, and Work conditioning.    Planned modalities to include: Cryotherapy (cold pack), Thermotherapy (hot pack), Ultrasound, Paraffin bath, and Fluidotherapy.         Visit Frequency: 2 times Per Week for 8 Weeks.        This plan was discussed with Patient.   Discussion participants: Agreed Upon Plan of Care    Goals:   Active       Occupational Therapy       Occupational Therapy Goal (Progressing)       Start:  03/07/25    Expected End:  05/07/25       Goals:   The following goals were discussed with the patient and patient is in agreement with them as to be addressed in the treatment plan.   Long Term Goals  (LTGs); to be met by discharge.  LTG #1: Pt will report a pain level of 2-3 out of 10 at best with functional UE daily tasks   LTG #2: Pt will demo improved FOTO score by 20 points.   LTG #3: Pt will return to prior level of function for use of (B)UE with work related activities.   LTG #4: Pt will demonstrate AROM WFL in order increase functional use of UE  LTG #5: Pt will display good scapulothoracic glide all scapular planes to improve functional reach and use of UE  LTG #6: Pt will demonstrate  strength of 20#s in order to increase functional use of UE.    Short Term Goals (STGs); to be met within 4 weeks.  STG #1: Pt will report 6 out of 10 pain level with functional use of (B)UE.  STG #2: Pt will demonstrate increased ROM 15 degrees (B) wrist with no report of pain to increase functional use of UE  STG #3: Pt will demonstrate independence with issued HEP.   STG #4: Pt will demonstrate increased  strength of 10#s  to increase functional use of UE.  STG #5: Pt will display good (B)UE elbow/wrist  stability  to improve functional reach and use of UE  STG #6:  Pt will report increased ability to complete daily tasks with use of UE to improve functional independence             Mio Nolasco OT

## 2025-03-27 ENCOUNTER — CLINICAL SUPPORT (OUTPATIENT)
Dept: REHABILITATION | Facility: HOSPITAL | Age: 65
End: 2025-03-27
Payer: MEDICARE

## 2025-03-27 DIAGNOSIS — M62.81 HAND MUSCLE WEAKNESS: Primary | ICD-10-CM

## 2025-03-27 DIAGNOSIS — M54.2 ACUTE NECK PAIN: ICD-10-CM

## 2025-03-27 DIAGNOSIS — R20.0 NUMBNESS AND TINGLING OF UPPER EXTREMITY: ICD-10-CM

## 2025-03-27 DIAGNOSIS — R20.2 NUMBNESS AND TINGLING OF UPPER EXTREMITY: ICD-10-CM

## 2025-03-27 DIAGNOSIS — M54.12 RADICULOPATHY, CERVICAL REGION: Primary | ICD-10-CM

## 2025-03-27 PROCEDURE — 97032 APPL MODALITY 1+ESTIM EA 15: CPT | Mod: PN

## 2025-03-27 PROCEDURE — 97140 MANUAL THERAPY 1/> REGIONS: CPT | Mod: PN

## 2025-03-27 PROCEDURE — 97110 THERAPEUTIC EXERCISES: CPT | Mod: PN

## 2025-03-27 NOTE — PROGRESS NOTES
"  Outpatient Rehab    Occupational Therapy Discharge    Patient Name: Vikki Hubbard  MRN: [id number]  YOB: 1960  Encounter Date: 3/27/2025    Therapy Diagnosis:   Encounter Diagnoses   Name Primary?    Hand muscle weakness Yes    Numbness and tingling of upper extremity      Physician: Raffaele Collins, *    Physician Orders: Eval and Treat  Medical Diagnosis: Carpal tunnel syndrome, bilateral upper limbs    Visit # / Visits Authorized: 6 / 10  Insurance Authorization Period: 3/6/2025 to 12/31/2025  Date of Evaluation: 3/7/2025  Plan of Care Certification: 3/7/2025 to 5/7/2025     Time In:     Time Out:    Total Time:     Total Billable Time:      FOTO:  Intake Score:  %  Survey Score 1:  %  Survey Score 2:  %         Subjective   "It feels better today.".  Pain reported as 4/10. Pt reports she had discussion with MD at last visit and will be having surgery on 4/15/2025.    Objective      Wrist Range of Motion  Right Wrist   Active (deg) Passive (deg) Pain Comment   Flexion 67         Extension 55         Radial Deviation 12         Ulnar Deviation 30           Left Wrist   Active (deg) Passive (deg) Pain Comment   Flexion 60         Extension 70         Radial Deviation 20         Ulnar Deviation 25                            Right  Strength  Right Hand Dynamometer Position: 2  Elbow Position Forearm Position Trial 1 (lbs) Trial 2  (lbs) Trial 3  (lbs) Average  (lbs) Pain   Flexed Neutral 30 30 30 30         Left  Strength  Left Hand Dynamometer Position: 2  Elbow Position Forearm Position Trial 1 (lbs) Trial 2 (lbs) Trial 3 (lbs) Average (lbs) Pain   Flexed Neutral 35               Right Pinch Strength   Trial 1 (lbs) Trial 2 (lbs) Trial 3 (lbs) Average (lbs) Pain   Lateral (Key Pinch) 11 11 11 11     Three Point (Three Jaw Pérez) 8 8 8 8     Two Point (Tip to Tip)                 Left Pinch Strength   Trial 1 (lbs) Trial 2 (lbs) Trial 3 (lbs) Average (lbs) Pain   Lateral (Key Pinch) 11 11 11 " 11     Three Point (Three Jaw Pérez) 12 12 12 12     Two Point (Tip to Tip)                          Treatment:  Therapeutic Exercise  TE 1: Green digiweb MP flex/ext: 20 reps each  TE 2: Green digiflex tripod and lateral pinches: 30 reps each  TE 3: Pronation/supination: 20 reps with pronator  TE 4: Theraball squeezers: 20 reps with 5 second hold  TE 5: Wrist ROM: flex/ext 20 reps each 3#  TE 6: Green sponge opposition: 30 reps  TE 7: Grooved peg board: 22 pegs 11 each hand.    Time Entry(in minutes):       Assessment & Plan   Assessment: Upon entry Pt reporting that she is feeling much better from previous session. She states that she is now scheduled for carpal tunnel release surgery on 4/15/2025. Due to therapy meant for conservative management of CTS symtpoms PT will be discharged from OT services with upcoming surgery scheduled.       Patient will continue to benefit from skilled outpatient occupational therapy to address the deficits listed in the problem list box on initial evaluation, provide pt/family education and to maximize pt's level of independence in the home and community environment.     Patient's spiritual, cultural, and educational needs considered and patient agreeable to plan of care and goals.           Plan: Pt to be discharged from OT services at this time.    Goals:   Active       Occupational Therapy       Occupational Therapy Goal (Unable to Meet)       Start:  03/07/25    Expected End:  05/07/25       Goals:   The following goals were discussed with the patient and patient is in agreement with them as to be addressed in the treatment plan.   Long Term Goals (LTGs); to be met by discharge.  LTG #1: Pt will report a pain level of 2-3 out of 10 at best with functional UE daily tasks   LTG #2: Pt will demo improved FOTO score by 20 points.   LTG #3: Pt will return to prior level of function for use of (B)UE with work related activities.   LTG #4: Pt will demonstrate AROM WFL in order  increase functional use of UE  LTG #5: Pt will display good scapulothoracic glide all scapular planes to improve functional reach and use of UE  LTG #6: Pt will demonstrate  strength of 20#s in order to increase functional use of UE.    Short Term Goals (STGs); to be met within 4 weeks.  STG #1: Pt will report 6 out of 10 pain level with functional use of (B)UE.  STG #2: Pt will demonstrate increased ROM 15 degrees (B) wrist with no report of pain to increase functional use of UE  STG #3: Pt will demonstrate independence with issued HEP.   STG #4: Pt will demonstrate increased  strength of 10#s  to increase functional use of UE.  STG #5: Pt will display good (B)UE elbow/wrist  stability  to improve functional reach and use of UE  STG #6:  Pt will report increased ability to complete daily tasks with use of UE to improve functional independence             Mio Nolasco OT

## 2025-03-27 NOTE — PROGRESS NOTES
Outpatient Rehab    Physical Therapy Visit    Patient Name: Vikki Hubbard  MRN: [id number]  YOB: 1960  Encounter Date: 3/27/2025    Therapy Diagnosis:   Encounter Diagnoses   Name Primary?    Radiculopathy, cervical region Yes    Acute neck pain        Physician: Raffaele Collins, *    Physician Orders: Eval and Treat  Medical Diagnosis: Radiculopathy, cervical region    Visit # / Visits Authorized:  9 / 20  Insurance Authorization Period: 2/18/2025 to 12/31/2025  Date of Evaluation: 2/19/25  Plan of Care Certification: 2/19/25 to 4/4/25     PT/PTA:     Number of PTA visits since last PT visit:   Time In: 1300   Time Out: 1340  Total Time: 40   Total Billable Time:      FOTO:  Intake Score:  %  Survey Score 1:  %  Survey Score 2:  %         Subjective   Patient reports her neck is feeling good but continues to have hand pain..         Objective            Treatment:  Therapeutic Exercise  TE 9: Bruegger's YTB 2 x10  TE 10: seated SB flexion STRETCH, 5 sec hold, 5x each direction  Manual Therapy  MT 2: supine STM cervical muscles, bilateral parascapular muscles  Balance/Neuromuscular Re-Education  NMR 1: Chin tucks 2 x 10 with 2sec hold    Time Entry(in minutes):  E-Stim (Attended) Time Entry: 10  Manual Therapy Time Entry: 15  Therapeutic Exercise Time Entry: 15  Number of Muscles Needled: 4  Needle Insertions Time Entry: 10    Assessment & Plan   Assessment: Patient disharged at this time secondary to meeting all goals.       Patient will continue to benefit from skilled outpatient physical therapy to address the deficits listed in the problem list box on initial evaluation, provide pt/family education and to maximize pt's level of independence in the home and community environment.     Patient's spiritual, cultural, and educational needs considered and patient agreeable to plan of care and goals.           Plan: Patient discharged at this time.    Goals:   Active       Functional outcome        Patient stated goal:    (Not Progressing)       Start:  02/19/25               Pain       Patient will report pain of 1/10 demonstrating a reduction of overall pain (Not Progressing)       Start:  02/19/25    Expected End:  03/25/25            Patient will report a 2 point reduction in pain while performing lifting, carrying activity. (Not Progressing)       Start:  02/19/25    Expected End:  04/02/25               Range of Motion       Patient will achieve bilateral cervical side bending ROM WNL (Not Progressing)       Start:  02/19/25    Expected End:  04/02/25            Patient will achieve bilateral cervical rotation ROM WNL (Not Progressing)       Start:  02/19/25    Expected End:  04/02/25            Patient will achieve cervical flexion ROM WNL (Not Progressing)       Start:  02/19/25    Expected End:  04/02/25            Patient will achieve cervical extension ROM WNL (Not Progressing)       Start:  02/19/25    Expected End:  04/02/25            Patient will achieve full cervical retraction (Not Progressing)       Start:  02/19/25    Expected End:  04/02/25            Patient will achieve full cervical protraction (Not Progressing)       Start:  02/19/25    Expected End:  04/02/25                Laly Boyle, PT

## 2025-04-08 ENCOUNTER — OFFICE VISIT (OUTPATIENT)
Dept: OBSTETRICS AND GYNECOLOGY | Facility: CLINIC | Age: 65
End: 2025-04-08
Payer: MEDICARE

## 2025-04-08 ENCOUNTER — HOSPITAL ENCOUNTER (OUTPATIENT)
Dept: RADIOLOGY | Facility: HOSPITAL | Age: 65
Discharge: HOME | End: 2025-04-08
Attending: OBSTETRICS & GYNECOLOGY
Payer: MEDICARE

## 2025-04-08 ENCOUNTER — RESULTS FOLLOW-UP (OUTPATIENT)
Dept: OBSTETRICS AND GYNECOLOGY | Facility: CLINIC | Age: 65
End: 2025-04-08

## 2025-04-08 VITALS — HEIGHT: 65 IN | BODY MASS INDEX: 25.33 KG/M2 | WEIGHT: 152 LBS

## 2025-04-08 VITALS
HEART RATE: 85 BPM | HEIGHT: 65 IN | WEIGHT: 151.63 LBS | SYSTOLIC BLOOD PRESSURE: 108 MMHG | BODY MASS INDEX: 25.26 KG/M2 | DIASTOLIC BLOOD PRESSURE: 64 MMHG

## 2025-04-08 DIAGNOSIS — Z13.820 ENCOUNTER FOR SCREENING FOR OSTEOPOROSIS: ICD-10-CM

## 2025-04-08 DIAGNOSIS — Z01.419 WELL WOMAN EXAM WITH ROUTINE GYNECOLOGICAL EXAM: Primary | ICD-10-CM

## 2025-04-08 DIAGNOSIS — Z12.31 SCREENING MAMMOGRAM FOR BREAST CANCER: Primary | ICD-10-CM

## 2025-04-08 DIAGNOSIS — Z78.0 ASYMPTOMATIC MENOPAUSAL STATE: ICD-10-CM

## 2025-04-08 DIAGNOSIS — Z12.31 SCREENING MAMMOGRAM FOR BREAST CANCER: ICD-10-CM

## 2025-04-08 PROCEDURE — 99999 PR STA SHADOW: CPT | Mod: PBBFAC,,, | Performed by: OBSTETRICS & GYNECOLOGY

## 2025-04-08 PROCEDURE — 77063 BREAST TOMOSYNTHESIS BI: CPT | Mod: 26,,, | Performed by: RADIOLOGY

## 2025-04-08 PROCEDURE — 77067 SCR MAMMO BI INCL CAD: CPT | Mod: 26,,, | Performed by: RADIOLOGY

## 2025-04-08 PROCEDURE — G0101 CA SCREEN;PELVIC/BREAST EXAM: HCPCS | Mod: S$PBB | Performed by: OBSTETRICS & GYNECOLOGY

## 2025-04-08 PROCEDURE — 99999 PR PBB SHADOW E&M-EST. PATIENT-LVL III: CPT | Mod: PBBFAC,,, | Performed by: OBSTETRICS & GYNECOLOGY

## 2025-04-08 PROCEDURE — 99213 OFFICE O/P EST LOW 20 MIN: CPT | Mod: PBBFAC,25 | Performed by: OBSTETRICS & GYNECOLOGY

## 2025-04-08 PROCEDURE — 77063 BREAST TOMOSYNTHESIS BI: CPT | Mod: TC

## 2025-04-08 RX ORDER — FINASTERIDE 5 MG/1
5 TABLET, FILM COATED ORAL
COMMUNITY
Start: 2025-03-10

## 2025-04-08 RX ORDER — ERGOCALCIFEROL 1.25 MG/1
50000 CAPSULE ORAL
COMMUNITY
Start: 2025-03-10

## 2025-04-08 NOTE — PROGRESS NOTES
Subjective:    Patient ID: Vikki Hubbard is a 64 y.o. y.o. female.     Chief Complaint: Annual Well Woman Exam     History of Present Illness:  Vikki presents today for Annual Well Woman exam. She describes her menses as absent.She denies pelvic pain.  She denies breast tenderness, masses, nipple discharge. She denies GYN complaints. She denies difficulty with urination or bowel movements. She denies menopausal symptoms such as hotflashes, vaginal dryness, and night sweats. She denies bloating, early satiety, or weight changes. She is not currently sexually active.      Menstrual History:   No LMP recorded. Patient is postmenopausal..     OB History          4    Para   4    Term   4            AB        Living             SAB        IAB        Ectopic        Multiple        Live Births                     The following portions of the patient's history were reviewed and updated as appropriate: allergies, current medications, past family history, past medical history, past social history, past surgical history, and problem list.    ROS:   Review of Systems   Constitutional:  Negative for chills, diaphoresis, fatigue, fever and unexpected weight change.   HENT:  Negative for congestion, hearing loss, postnasal drip, rhinorrhea, sinus pressure, sinus pain, sore throat and tinnitus.    Eyes:  Negative for pain, discharge and visual disturbance.   Respiratory:  Negative for apnea, cough, shortness of breath and wheezing.    Cardiovascular:  Negative for chest pain, palpitations and leg swelling.   Gastrointestinal:  Negative for abdominal pain, constipation, diarrhea, nausea and vomiting.   Endocrine: Negative for cold intolerance, heat intolerance, polydipsia, polyphagia and polyuria.   Genitourinary:  Negative for difficulty urinating, dyspareunia, dysuria, enuresis, flank pain, frequency, genital sores, hematuria, menstrual problem, pelvic pain, urgency, vaginal bleeding, vaginal discharge and vaginal  pain.   Musculoskeletal:  Negative for arthralgias, back pain, joint swelling, myalgias, neck pain and neck stiffness.   Skin:  Negative for color change, pallor and rash.   Allergic/Immunologic: Negative for environmental allergies, food allergies and immunocompromised state.   Neurological:  Negative for dizziness, weakness, light-headedness, numbness and headaches.   Hematological:  Negative for adenopathy. Does not bruise/bleed easily.   Psychiatric/Behavioral:  Negative for agitation and confusion. The patient is not nervous/anxious.          Objective:    Vital Signs:  Vitals:    04/08/25 1112   BP: 108/64   Pulse: 85       Physical Exam:   Examination performed with Chaperone present  General:  alert, cooperative, no distress   Skin:  Skin color, texture, turgor normal. No rashes or lesions   HEENT:  extra ocular movement intact, sclera clear, anicteric   Neck: supple, trachea midline, no adenopathy or thyromegally   Respiratory:  Normal effort   Breasts:  no discharge, erythema, tenderness, or palpable masses; no axillary lymphadenopathy   Abdomen:  soft, nontender, no palpable masses   Pelvis: External genitalia: normal general appearance  Urinary system: urethral meatus normal, bladder nontender  Vaginal: normal without tenderness, induration or masses, atrophic mucosa  Cervix: normal appearance  Uterus: normal size, shape, position  Adnexa: non palpable   Extremities: Normal ROM; no edema, no cyanosis   Neurologial: Normal strength and tone. No focal numbness or weakness.   Psychiatric: normal mood, speech, dress, and thought processes         Assessment:       Healthy female exam.     1. Well woman exam with routine gynecological exam    2. Encounter for screening for osteoporosis    3. Asymptomatic menopausal state          Plan:      Well woman exam with routine gynecological exam    Encounter for screening for osteoporosis  -     DXA Bone Density Axial Skeleton 1 or more sites; Future; Expected date:  04/08/2025    Asymptomatic menopausal state  -     DXA Bone Density Axial Skeleton 1 or more sites; Future; Expected date: 04/08/2025      MMG pending today    COUNSELING:  Vikki was counseled on A.C.O.G. Pap guidelines and recommendations for yearly pelvic exams in addition to recommendations for monthly self breast exams; to see her PCP for other health maintenance.

## 2025-06-13 ENCOUNTER — LAB VISIT (OUTPATIENT)
Dept: LAB | Facility: HOSPITAL | Age: 65
End: 2025-06-13
Attending: INTERNAL MEDICINE
Payer: MEDICARE

## 2025-06-13 DIAGNOSIS — E55.9 VITAMIN D DEFICIENCY: ICD-10-CM

## 2025-06-13 DIAGNOSIS — E03.9 HYPOTHYROIDISM, ADULT: Primary | ICD-10-CM

## 2025-06-13 LAB
25(OH)D3+25(OH)D2 SERPL-MCNC: 37 NG/ML (ref 30–96)
T4 SERPL-MCNC: 10.8 UG/DL (ref 4.5–11.5)
TSH SERPL-ACNC: 0.99 UIU/ML (ref 0.4–4)

## 2025-06-13 PROCEDURE — 84443 ASSAY THYROID STIM HORMONE: CPT

## 2025-06-13 PROCEDURE — 84436 ASSAY OF TOTAL THYROXINE: CPT

## 2025-06-13 PROCEDURE — 82306 VITAMIN D 25 HYDROXY: CPT

## 2025-06-13 PROCEDURE — 36415 COLL VENOUS BLD VENIPUNCTURE: CPT
